# Patient Record
Sex: MALE | Race: WHITE | NOT HISPANIC OR LATINO | Employment: OTHER | URBAN - METROPOLITAN AREA
[De-identification: names, ages, dates, MRNs, and addresses within clinical notes are randomized per-mention and may not be internally consistent; named-entity substitution may affect disease eponyms.]

---

## 2021-09-05 ENCOUNTER — APPOINTMENT (OUTPATIENT)
Dept: RADIOLOGY | Facility: MEDICAL CENTER | Age: 86
DRG: 268 | End: 2021-09-05
Attending: SURGERY
Payer: MEDICARE

## 2021-09-05 ENCOUNTER — HOSPITAL ENCOUNTER (OUTPATIENT)
Dept: RADIOLOGY | Facility: MEDICAL CENTER | Age: 86
End: 2021-09-05

## 2021-09-05 ENCOUNTER — HOSPITAL ENCOUNTER (INPATIENT)
Facility: MEDICAL CENTER | Age: 86
LOS: 4 days | DRG: 268 | End: 2021-09-09
Attending: EMERGENCY MEDICINE | Admitting: INTERNAL MEDICINE
Payer: MEDICARE

## 2021-09-05 ENCOUNTER — ANESTHESIA (OUTPATIENT)
Dept: SURGERY | Facility: MEDICAL CENTER | Age: 86
DRG: 268 | End: 2021-09-05
Payer: MEDICARE

## 2021-09-05 ENCOUNTER — ANESTHESIA EVENT (OUTPATIENT)
Dept: SURGERY | Facility: MEDICAL CENTER | Age: 86
DRG: 268 | End: 2021-09-05
Payer: MEDICARE

## 2021-09-05 DIAGNOSIS — I70.201 FEMORAL ARTERY OCCLUSION, RIGHT (HCC): ICD-10-CM

## 2021-09-05 DIAGNOSIS — I71.40 ABDOMINAL AORTIC ANEURYSM (AAA) WITHOUT RUPTURE (HCC): Chronic | ICD-10-CM

## 2021-09-05 DIAGNOSIS — I70.90 ARTERIAL OCCLUSION: ICD-10-CM

## 2021-09-05 PROBLEM — D72.823 LEUKEMOID REACTION: Status: ACTIVE | Noted: 2021-09-05

## 2021-09-05 PROBLEM — R73.9 HYPERGLYCEMIA: Status: ACTIVE | Noted: 2021-09-05

## 2021-09-05 PROBLEM — R74.8 ALKALINE PHOSPHATASE ELEVATION: Status: ACTIVE | Noted: 2021-09-05

## 2021-09-05 PROBLEM — R03.0 ELEVATED BP WITHOUT DIAGNOSIS OF HYPERTENSION: Status: ACTIVE | Noted: 2021-09-05

## 2021-09-05 LAB
ABO + RH BLD: NORMAL
ABO GROUP BLD: NORMAL
ALBUMIN SERPL BCP-MCNC: 3.9 G/DL (ref 3.2–4.9)
ALBUMIN/GLOB SERPL: 1.4 G/DL
ALP SERPL-CCNC: 101 U/L (ref 30–99)
ALT SERPL-CCNC: 11 U/L (ref 2–50)
ANION GAP SERPL CALC-SCNC: 12 MMOL/L (ref 7–16)
APTT PPP: 224.2 SEC (ref 24.7–36)
AST SERPL-CCNC: 23 U/L (ref 12–45)
BASOPHILS # BLD AUTO: 0.2 % (ref 0–1.8)
BASOPHILS # BLD: 0.04 K/UL (ref 0–0.12)
BILIRUB SERPL-MCNC: 0.6 MG/DL (ref 0.1–1.5)
BLD GP AB SCN SERPL QL: NORMAL
BUN SERPL-MCNC: 18 MG/DL (ref 8–22)
CALCIUM SERPL-MCNC: 8.5 MG/DL (ref 8.5–10.5)
CHLORIDE SERPL-SCNC: 102 MMOL/L (ref 96–112)
CO2 SERPL-SCNC: 25 MMOL/L (ref 20–33)
CREAT SERPL-MCNC: 0.72 MG/DL (ref 0.5–1.4)
EKG IMPRESSION: NORMAL
EOSINOPHIL # BLD AUTO: 0.03 K/UL (ref 0–0.51)
EOSINOPHIL NFR BLD: 0.2 % (ref 0–6.9)
ERYTHROCYTE [DISTWIDTH] IN BLOOD BY AUTOMATED COUNT: 44.3 FL (ref 35.9–50)
EST. AVERAGE GLUCOSE BLD GHB EST-MCNC: 111 MG/DL
GLOBULIN SER CALC-MCNC: 2.7 G/DL (ref 1.9–3.5)
GLUCOSE SERPL-MCNC: 164 MG/DL (ref 65–99)
HBA1C MFR BLD: 5.5 % (ref 4–5.6)
HCT VFR BLD AUTO: 44.6 % (ref 42–52)
HGB BLD-MCNC: 14.7 G/DL (ref 14–18)
IMM GRANULOCYTES # BLD AUTO: 0.14 K/UL (ref 0–0.11)
IMM GRANULOCYTES NFR BLD AUTO: 0.7 % (ref 0–0.9)
INR PPP: 1.34 (ref 0.87–1.13)
LYMPHOCYTES # BLD AUTO: 0.99 K/UL (ref 1–4.8)
LYMPHOCYTES NFR BLD: 5 % (ref 22–41)
MCH RBC QN AUTO: 32.5 PG (ref 27–33)
MCHC RBC AUTO-ENTMCNC: 33 G/DL (ref 33.7–35.3)
MCV RBC AUTO: 98.5 FL (ref 81.4–97.8)
MONOCYTES # BLD AUTO: 1.5 K/UL (ref 0–0.85)
MONOCYTES NFR BLD AUTO: 7.5 % (ref 0–13.4)
NEUTROPHILS # BLD AUTO: 17.28 K/UL (ref 1.82–7.42)
NEUTROPHILS NFR BLD: 86.4 % (ref 44–72)
NRBC # BLD AUTO: 0 K/UL
NRBC BLD-RTO: 0 /100 WBC
PLATELET # BLD AUTO: 223 K/UL (ref 164–446)
PMV BLD AUTO: 11.6 FL (ref 9–12.9)
POTASSIUM SERPL-SCNC: 4.1 MMOL/L (ref 3.6–5.5)
PROT SERPL-MCNC: 6.6 G/DL (ref 6–8.2)
PROTHROMBIN TIME: 16.2 SEC (ref 12–14.6)
RBC # BLD AUTO: 4.53 M/UL (ref 4.7–6.1)
RH BLD: NORMAL
SARS-COV+SARS-COV-2 AG RESP QL IA.RAPID: NOTDETECTED
SODIUM SERPL-SCNC: 139 MMOL/L (ref 135–145)
SPECIMEN SOURCE: NORMAL
UFH PPP CHRO-ACNC: 0.94 IU/ML
WBC # BLD AUTO: 20 K/UL (ref 4.8–10.8)

## 2021-09-05 PROCEDURE — 34705 EVAC RPR A-BIILIAC NDGFT: CPT | Performed by: SURGERY

## 2021-09-05 PROCEDURE — 160041 HCHG SURGERY MINUTES - EA ADDL 1 MIN LEVEL 4: Performed by: SURGERY

## 2021-09-05 PROCEDURE — 700101 HCHG RX REV CODE 250: Performed by: STUDENT IN AN ORGANIZED HEALTH CARE EDUCATION/TRAINING PROGRAM

## 2021-09-05 PROCEDURE — 36415 COLL VENOUS BLD VENIPUNCTURE: CPT

## 2021-09-05 PROCEDURE — C1757 CATH, THROMBECTOMY/EMBOLECT: HCPCS | Performed by: SURGERY

## 2021-09-05 PROCEDURE — 75710 ARTERY X-RAYS ARM/LEG: CPT | Mod: 26,59,RT | Performed by: SURGERY

## 2021-09-05 PROCEDURE — 500869 HCHG NEEDLE, THINWALL 19GA (COOK): Performed by: SURGERY

## 2021-09-05 PROCEDURE — 75625 CONTRAST EXAM ABDOMINL AORTA: CPT | Mod: 26,59 | Performed by: SURGERY

## 2021-09-05 PROCEDURE — 85520 HEPARIN ASSAY: CPT

## 2021-09-05 PROCEDURE — 700105 HCHG RX REV CODE 258: Performed by: SURGERY

## 2021-09-05 PROCEDURE — 99291 CRITICAL CARE FIRST HOUR: CPT

## 2021-09-05 PROCEDURE — 86850 RBC ANTIBODY SCREEN: CPT

## 2021-09-05 PROCEDURE — 04V03EZ RESTRICTION OF ABDOMINAL AORTA WITH BRANCHED OR FENESTRATED INTRALUMINAL DEVICE, ONE OR TWO ARTERIES, PERCUTANEOUS APPROACH: ICD-10-PCS | Performed by: SURGERY

## 2021-09-05 PROCEDURE — 80053 COMPREHEN METABOLIC PANEL: CPT

## 2021-09-05 PROCEDURE — 03HY32Z INSERTION OF MONITORING DEVICE INTO UPPER ARTERY, PERCUTANEOUS APPROACH: ICD-10-PCS | Performed by: STUDENT IN AN ORGANIZED HEALTH CARE EDUCATION/TRAINING PROGRAM

## 2021-09-05 PROCEDURE — C1769 GUIDE WIRE: HCPCS | Performed by: SURGERY

## 2021-09-05 PROCEDURE — 85730 THROMBOPLASTIN TIME PARTIAL: CPT

## 2021-09-05 PROCEDURE — C1894 INTRO/SHEATH, NON-LASER: HCPCS | Performed by: SURGERY

## 2021-09-05 PROCEDURE — 87426 SARSCOV CORONAVIRUS AG IA: CPT

## 2021-09-05 PROCEDURE — 700101 HCHG RX REV CODE 250: Performed by: SURGERY

## 2021-09-05 PROCEDURE — 700111 HCHG RX REV CODE 636 W/ 250 OVERRIDE (IP): Performed by: STUDENT IN AN ORGANIZED HEALTH CARE EDUCATION/TRAINING PROGRAM

## 2021-09-05 PROCEDURE — 99223 1ST HOSP IP/OBS HIGH 75: CPT | Mod: AI | Performed by: INTERNAL MEDICINE

## 2021-09-05 PROCEDURE — 501838 HCHG SUTURE GENERAL: Performed by: SURGERY

## 2021-09-05 PROCEDURE — 502000 HCHG MISC OR IMPLANTS RC 0278: Performed by: SURGERY

## 2021-09-05 PROCEDURE — 160036 HCHG PACU - EA ADDL 30 MINS PHASE I: Performed by: SURGERY

## 2021-09-05 PROCEDURE — 500258 HCHG CATH, SIZING OMNI 5F 70CM: Performed by: SURGERY

## 2021-09-05 PROCEDURE — A9270 NON-COVERED ITEM OR SERVICE: HCPCS | Performed by: SURGERY

## 2021-09-05 PROCEDURE — 93005 ELECTROCARDIOGRAM TRACING: CPT | Performed by: EMERGENCY MEDICINE

## 2021-09-05 PROCEDURE — 86900 BLOOD TYPING SEROLOGIC ABO: CPT

## 2021-09-05 PROCEDURE — 160035 HCHG PACU - 1ST 60 MINS PHASE I: Performed by: SURGERY

## 2021-09-05 PROCEDURE — 500444 HCHG HEMOSTAT, SURGICEL 2X3: Performed by: SURGERY

## 2021-09-05 PROCEDURE — 34203 REMOVAL OF LEG ARTERY CLOT: CPT | Mod: RT | Performed by: SURGERY

## 2021-09-05 PROCEDURE — 83036 HEMOGLOBIN GLYCOSYLATED A1C: CPT

## 2021-09-05 PROCEDURE — 34812 OPN FEM ART EXPOS: CPT | Mod: 50,59 | Performed by: SURGERY

## 2021-09-05 PROCEDURE — 700102 HCHG RX REV CODE 250 W/ 637 OVERRIDE(OP): Performed by: SURGERY

## 2021-09-05 PROCEDURE — C1725 CATH, TRANSLUMIN NON-LASER: HCPCS | Performed by: SURGERY

## 2021-09-05 PROCEDURE — 86901 BLOOD TYPING SEROLOGIC RH(D): CPT

## 2021-09-05 PROCEDURE — C1887 CATHETER, GUIDING: HCPCS

## 2021-09-05 PROCEDURE — 99221 1ST HOSP IP/OBS SF/LOW 40: CPT | Mod: 57 | Performed by: SURGERY

## 2021-09-05 PROCEDURE — 96365 THER/PROPH/DIAG IV INF INIT: CPT

## 2021-09-05 PROCEDURE — A9270 NON-COVERED ITEM OR SERVICE: HCPCS | Performed by: STUDENT IN AN ORGANIZED HEALTH CARE EDUCATION/TRAINING PROGRAM

## 2021-09-05 PROCEDURE — 04CK0ZZ EXTIRPATION OF MATTER FROM RIGHT FEMORAL ARTERY, OPEN APPROACH: ICD-10-PCS | Performed by: SURGERY

## 2021-09-05 PROCEDURE — 770006 HCHG ROOM/CARE - MED/SURG/GYN SEMI*

## 2021-09-05 PROCEDURE — 502594 HCHG SCISSOR HANDLE, HARMONIC ACE: Performed by: SURGERY

## 2021-09-05 PROCEDURE — 500166 HCHG CATH, ANGIO: Performed by: SURGERY

## 2021-09-05 PROCEDURE — 160048 HCHG OR STATISTICAL LEVEL 1-5: Performed by: SURGERY

## 2021-09-05 PROCEDURE — 85025 COMPLETE CBC W/AUTO DIFF WBC: CPT

## 2021-09-05 PROCEDURE — 160009 HCHG ANES TIME/MIN: Performed by: SURGERY

## 2021-09-05 PROCEDURE — 700105 HCHG RX REV CODE 258: Performed by: STUDENT IN AN ORGANIZED HEALTH CARE EDUCATION/TRAINING PROGRAM

## 2021-09-05 PROCEDURE — 700102 HCHG RX REV CODE 250 W/ 637 OVERRIDE(OP): Performed by: STUDENT IN AN ORGANIZED HEALTH CARE EDUCATION/TRAINING PROGRAM

## 2021-09-05 PROCEDURE — 35226 REPAIR BLOOD VESSEL DIR LXTR: CPT | Mod: 59,LT | Performed by: SURGERY

## 2021-09-05 PROCEDURE — 700117 HCHG RX CONTRAST REV CODE 255: Performed by: SURGERY

## 2021-09-05 PROCEDURE — 85610 PROTHROMBIN TIME: CPT

## 2021-09-05 PROCEDURE — 700111 HCHG RX REV CODE 636 W/ 250 OVERRIDE (IP): Performed by: EMERGENCY MEDICINE

## 2021-09-05 PROCEDURE — 501837 HCHG SUTURE CV: Performed by: SURGERY

## 2021-09-05 PROCEDURE — 160029 HCHG SURGERY MINUTES - 1ST 30 MINS LEVEL 4: Performed by: SURGERY

## 2021-09-05 PROCEDURE — 160002 HCHG RECOVERY MINUTES (STAT): Performed by: SURGERY

## 2021-09-05 PROCEDURE — 34201 REMOVAL OF ARTERY CLOT: CPT | Mod: RT | Performed by: SURGERY

## 2021-09-05 PROCEDURE — 04CM0ZZ EXTIRPATION OF MATTER FROM RIGHT POPLITEAL ARTERY, OPEN APPROACH: ICD-10-PCS | Performed by: SURGERY

## 2021-09-05 PROCEDURE — 05HB33Z INSERTION OF INFUSION DEVICE INTO RIGHT BASILIC VEIN, PERCUTANEOUS APPROACH: ICD-10-PCS | Performed by: STUDENT IN AN ORGANIZED HEALTH CARE EDUCATION/TRAINING PROGRAM

## 2021-09-05 DEVICE — IMPLANTABLE DEVICE: Type: IMPLANTABLE DEVICE | Status: FUNCTIONAL

## 2021-09-05 RX ORDER — ONDANSETRON 2 MG/ML
4 INJECTION INTRAMUSCULAR; INTRAVENOUS
Status: COMPLETED | OUTPATIENT
Start: 2021-09-05 | End: 2021-09-05

## 2021-09-05 RX ORDER — ONDANSETRON 2 MG/ML
4 INJECTION INTRAMUSCULAR; INTRAVENOUS EVERY 4 HOURS PRN
Status: DISCONTINUED | OUTPATIENT
Start: 2021-09-05 | End: 2021-09-09 | Stop reason: HOSPADM

## 2021-09-05 RX ORDER — HEPARIN SODIUM 1000 [USP'U]/ML
40 INJECTION, SOLUTION INTRAVENOUS; SUBCUTANEOUS PRN
Status: DISCONTINUED | OUTPATIENT
Start: 2021-09-05 | End: 2021-09-05

## 2021-09-05 RX ORDER — HEPARIN SODIUM 1000 [USP'U]/ML
INJECTION, SOLUTION INTRAVENOUS; SUBCUTANEOUS PRN
Status: DISCONTINUED | OUTPATIENT
Start: 2021-09-05 | End: 2021-09-05 | Stop reason: SURG

## 2021-09-05 RX ORDER — SODIUM CHLORIDE, SODIUM LACTATE, POTASSIUM CHLORIDE, CALCIUM CHLORIDE 600; 310; 30; 20 MG/100ML; MG/100ML; MG/100ML; MG/100ML
INJECTION, SOLUTION INTRAVENOUS CONTINUOUS
Status: ACTIVE | OUTPATIENT
Start: 2021-09-05 | End: 2021-09-06

## 2021-09-05 RX ORDER — LABETALOL HYDROCHLORIDE 5 MG/ML
10 INJECTION, SOLUTION INTRAVENOUS EVERY 4 HOURS PRN
Status: DISCONTINUED | OUTPATIENT
Start: 2021-09-05 | End: 2021-09-09 | Stop reason: HOSPADM

## 2021-09-05 RX ORDER — HALOPERIDOL 5 MG/ML
1 INJECTION INTRAMUSCULAR
Status: DISCONTINUED | OUTPATIENT
Start: 2021-09-05 | End: 2021-09-05 | Stop reason: HOSPADM

## 2021-09-05 RX ORDER — ONDANSETRON 2 MG/ML
INJECTION INTRAMUSCULAR; INTRAVENOUS PRN
Status: DISCONTINUED | OUTPATIENT
Start: 2021-09-05 | End: 2021-09-05 | Stop reason: SURG

## 2021-09-05 RX ORDER — OXYCODONE HCL 5 MG/5 ML
10 SOLUTION, ORAL ORAL
Status: DISCONTINUED | OUTPATIENT
Start: 2021-09-05 | End: 2021-09-05 | Stop reason: HOSPADM

## 2021-09-05 RX ORDER — HEPARIN SODIUM 5000 [USP'U]/100ML
0-30 INJECTION, SOLUTION INTRAVENOUS CONTINUOUS
Status: DISCONTINUED | OUTPATIENT
Start: 2021-09-05 | End: 2021-09-05

## 2021-09-05 RX ORDER — OXYCODONE HYDROCHLORIDE 5 MG/1
5 TABLET ORAL
Status: DISCONTINUED | OUTPATIENT
Start: 2021-09-05 | End: 2021-09-05

## 2021-09-05 RX ORDER — LIDOCAINE HYDROCHLORIDE 40 MG/ML
SOLUTION TOPICAL PRN
Status: DISCONTINUED | OUTPATIENT
Start: 2021-09-05 | End: 2021-09-05 | Stop reason: SURG

## 2021-09-05 RX ORDER — BISACODYL 10 MG
10 SUPPOSITORY, RECTAL RECTAL
Status: DISCONTINUED | OUTPATIENT
Start: 2021-09-05 | End: 2021-09-07

## 2021-09-05 RX ORDER — MORPHINE SULFATE 4 MG/ML
1-3 INJECTION, SOLUTION INTRAMUSCULAR; INTRAVENOUS
Status: DISCONTINUED | OUTPATIENT
Start: 2021-09-05 | End: 2021-09-07

## 2021-09-05 RX ORDER — SODIUM CHLORIDE, SODIUM LACTATE, POTASSIUM CHLORIDE, CALCIUM CHLORIDE 600; 310; 30; 20 MG/100ML; MG/100ML; MG/100ML; MG/100ML
INJECTION, SOLUTION INTRAVENOUS
Status: DISCONTINUED | OUTPATIENT
Start: 2021-09-05 | End: 2021-09-05 | Stop reason: SURG

## 2021-09-05 RX ORDER — AMOXICILLIN 250 MG
2 CAPSULE ORAL 2 TIMES DAILY
Status: DISCONTINUED | OUTPATIENT
Start: 2021-09-05 | End: 2021-09-07

## 2021-09-05 RX ORDER — DIPHENHYDRAMINE HYDROCHLORIDE 50 MG/ML
12.5 INJECTION INTRAMUSCULAR; INTRAVENOUS
Status: DISCONTINUED | OUTPATIENT
Start: 2021-09-05 | End: 2021-09-05 | Stop reason: HOSPADM

## 2021-09-05 RX ORDER — CEFAZOLIN SODIUM 1 G/3ML
INJECTION, POWDER, FOR SOLUTION INTRAMUSCULAR; INTRAVENOUS PRN
Status: DISCONTINUED | OUTPATIENT
Start: 2021-09-05 | End: 2021-09-05 | Stop reason: SURG

## 2021-09-05 RX ORDER — ONDANSETRON 4 MG/1
4 TABLET, ORALLY DISINTEGRATING ORAL EVERY 4 HOURS PRN
Status: DISCONTINUED | OUTPATIENT
Start: 2021-09-05 | End: 2021-09-09 | Stop reason: HOSPADM

## 2021-09-05 RX ORDER — HYDROMORPHONE HYDROCHLORIDE 1 MG/ML
0.1 INJECTION, SOLUTION INTRAMUSCULAR; INTRAVENOUS; SUBCUTANEOUS
Status: DISCONTINUED | OUTPATIENT
Start: 2021-09-05 | End: 2021-09-05 | Stop reason: HOSPADM

## 2021-09-05 RX ORDER — BUPIVACAINE HYDROCHLORIDE AND EPINEPHRINE 5; 5 MG/ML; UG/ML
INJECTION, SOLUTION EPIDURAL; INTRACAUDAL; PERINEURAL
Status: DISCONTINUED | OUTPATIENT
Start: 2021-09-05 | End: 2021-09-05 | Stop reason: HOSPADM

## 2021-09-05 RX ORDER — GABAPENTIN 300 MG/1
300 CAPSULE ORAL ONCE
Status: COMPLETED | OUTPATIENT
Start: 2021-09-05 | End: 2021-09-05

## 2021-09-05 RX ORDER — HYDROCODONE BITARTRATE AND ACETAMINOPHEN 5; 325 MG/1; MG/1
1-2 TABLET ORAL EVERY 6 HOURS PRN
Status: DISCONTINUED | OUTPATIENT
Start: 2021-09-05 | End: 2021-09-07

## 2021-09-05 RX ORDER — HYDROMORPHONE HYDROCHLORIDE 1 MG/ML
0.4 INJECTION, SOLUTION INTRAMUSCULAR; INTRAVENOUS; SUBCUTANEOUS
Status: DISCONTINUED | OUTPATIENT
Start: 2021-09-05 | End: 2021-09-05 | Stop reason: HOSPADM

## 2021-09-05 RX ORDER — HYDROMORPHONE HYDROCHLORIDE 1 MG/ML
0.2 INJECTION, SOLUTION INTRAMUSCULAR; INTRAVENOUS; SUBCUTANEOUS
Status: DISCONTINUED | OUTPATIENT
Start: 2021-09-05 | End: 2021-09-05 | Stop reason: HOSPADM

## 2021-09-05 RX ORDER — POLYETHYLENE GLYCOL 3350 17 G/17G
1 POWDER, FOR SOLUTION ORAL
Status: DISCONTINUED | OUTPATIENT
Start: 2021-09-05 | End: 2021-09-07

## 2021-09-05 RX ORDER — ONDANSETRON 2 MG/ML
4 INJECTION INTRAMUSCULAR; INTRAVENOUS EVERY 4 HOURS PRN
Status: DISCONTINUED | OUTPATIENT
Start: 2021-09-05 | End: 2021-09-05

## 2021-09-05 RX ORDER — IPRATROPIUM BROMIDE AND ALBUTEROL SULFATE 2.5; .5 MG/3ML; MG/3ML
3 SOLUTION RESPIRATORY (INHALATION)
Status: DISCONTINUED | OUTPATIENT
Start: 2021-09-05 | End: 2021-09-05 | Stop reason: HOSPADM

## 2021-09-05 RX ORDER — CEFAZOLIN SODIUM 2 G/100ML
2 INJECTION, SOLUTION INTRAVENOUS EVERY 8 HOURS
Status: COMPLETED | OUTPATIENT
Start: 2021-09-06 | End: 2021-09-06

## 2021-09-05 RX ORDER — LABETALOL HYDROCHLORIDE 5 MG/ML
5 INJECTION, SOLUTION INTRAVENOUS
Status: DISCONTINUED | OUTPATIENT
Start: 2021-09-05 | End: 2021-09-05 | Stop reason: HOSPADM

## 2021-09-05 RX ORDER — OXYCODONE HYDROCHLORIDE 5 MG/1
2.5 TABLET ORAL
Status: DISCONTINUED | OUTPATIENT
Start: 2021-09-05 | End: 2021-09-05

## 2021-09-05 RX ORDER — LABETALOL HYDROCHLORIDE 5 MG/ML
5 INJECTION, SOLUTION INTRAVENOUS
Status: COMPLETED | OUTPATIENT
Start: 2021-09-05 | End: 2021-09-05

## 2021-09-05 RX ORDER — METOPROLOL TARTRATE 1 MG/ML
1 INJECTION, SOLUTION INTRAVENOUS
Status: DISCONTINUED | OUTPATIENT
Start: 2021-09-05 | End: 2021-09-05 | Stop reason: HOSPADM

## 2021-09-05 RX ORDER — OXYCODONE HCL 5 MG/5 ML
5 SOLUTION, ORAL ORAL
Status: DISCONTINUED | OUTPATIENT
Start: 2021-09-05 | End: 2021-09-05 | Stop reason: HOSPADM

## 2021-09-05 RX ORDER — KETAMINE HYDROCHLORIDE 50 MG/ML
INJECTION, SOLUTION INTRAMUSCULAR; INTRAVENOUS PRN
Status: DISCONTINUED | OUTPATIENT
Start: 2021-09-05 | End: 2021-09-05 | Stop reason: SURG

## 2021-09-05 RX ORDER — IODIXANOL 270 MG/ML
INJECTION, SOLUTION INTRAVASCULAR
Status: DISCONTINUED | OUTPATIENT
Start: 2021-09-05 | End: 2021-09-05 | Stop reason: HOSPADM

## 2021-09-05 RX ORDER — ACETAMINOPHEN 500 MG
1000 TABLET ORAL ONCE
Status: COMPLETED | OUTPATIENT
Start: 2021-09-05 | End: 2021-09-05

## 2021-09-05 RX ORDER — DEXAMETHASONE SODIUM PHOSPHATE 4 MG/ML
INJECTION, SOLUTION INTRA-ARTICULAR; INTRALESIONAL; INTRAMUSCULAR; INTRAVENOUS; SOFT TISSUE PRN
Status: DISCONTINUED | OUTPATIENT
Start: 2021-09-05 | End: 2021-09-05 | Stop reason: SURG

## 2021-09-05 RX ORDER — ACETAMINOPHEN 500 MG
500 TABLET ORAL EVERY 6 HOURS PRN
Status: DISCONTINUED | OUTPATIENT
Start: 2021-09-05 | End: 2021-09-09 | Stop reason: HOSPADM

## 2021-09-05 RX ORDER — HYDROMORPHONE HYDROCHLORIDE 1 MG/ML
0.25 INJECTION, SOLUTION INTRAMUSCULAR; INTRAVENOUS; SUBCUTANEOUS
Status: DISCONTINUED | OUTPATIENT
Start: 2021-09-05 | End: 2021-09-05

## 2021-09-05 RX ORDER — HYDRALAZINE HYDROCHLORIDE 20 MG/ML
5 INJECTION INTRAMUSCULAR; INTRAVENOUS
Status: DISCONTINUED | OUTPATIENT
Start: 2021-09-05 | End: 2021-09-05 | Stop reason: HOSPADM

## 2021-09-05 RX ADMIN — SODIUM CHLORIDE, POTASSIUM CHLORIDE, SODIUM LACTATE AND CALCIUM CHLORIDE: 600; 310; 30; 20 INJECTION, SOLUTION INTRAVENOUS at 21:16

## 2021-09-05 RX ADMIN — ONDANSETRON 4 MG: 2 INJECTION INTRAMUSCULAR; INTRAVENOUS at 16:28

## 2021-09-05 RX ADMIN — KETAMINE HYDROCHLORIDE 60 MG: 50 INJECTION INTRAMUSCULAR; INTRAVENOUS at 12:06

## 2021-09-05 RX ADMIN — ACETAMINOPHEN 1000 MG: 500 TABLET ORAL at 11:34

## 2021-09-05 RX ADMIN — ROCURONIUM BROMIDE 100 MG: 10 INJECTION, SOLUTION INTRAVENOUS at 12:06

## 2021-09-05 RX ADMIN — LIDOCAINE HYDROCHLORIDE 4 ML: 40 SOLUTION TOPICAL at 12:06

## 2021-09-05 RX ADMIN — HEPARIN SODIUM 5000 UNITS: 1000 INJECTION, SOLUTION INTRAVENOUS; SUBCUTANEOUS at 12:50

## 2021-09-05 RX ADMIN — ONDANSETRON 4 MG: 2 INJECTION INTRAMUSCULAR; INTRAVENOUS at 12:43

## 2021-09-05 RX ADMIN — CEFAZOLIN 2 G: 330 INJECTION, POWDER, FOR SOLUTION INTRAMUSCULAR; INTRAVENOUS at 12:07

## 2021-09-05 RX ADMIN — SUGAMMADEX 200 MG: 100 INJECTION, SOLUTION INTRAVENOUS at 14:50

## 2021-09-05 RX ADMIN — SODIUM CHLORIDE, POTASSIUM CHLORIDE, SODIUM LACTATE AND CALCIUM CHLORIDE: 600; 310; 30; 20 INJECTION, SOLUTION INTRAVENOUS at 11:48

## 2021-09-05 RX ADMIN — GABAPENTIN 300 MG: 300 CAPSULE ORAL at 11:34

## 2021-09-05 RX ADMIN — PHENYLEPHRINE HYDROCHLORIDE 15 MCG/MIN: 10 INJECTION INTRAVENOUS at 12:51

## 2021-09-05 RX ADMIN — METOPROLOL TARTRATE 25 MG: 25 TABLET, FILM COATED ORAL at 21:12

## 2021-09-05 RX ADMIN — FENTANYL CITRATE 100 MCG: 50 INJECTION, SOLUTION INTRAMUSCULAR; INTRAVENOUS at 12:02

## 2021-09-05 RX ADMIN — LABETALOL HYDROCHLORIDE 5 MG: 5 INJECTION INTRAVENOUS at 11:34

## 2021-09-05 RX ADMIN — EPHEDRINE SULFATE 10 MG: 50 INJECTION INTRAMUSCULAR; INTRAVENOUS; SUBCUTANEOUS at 14:15

## 2021-09-05 RX ADMIN — HEPARIN SODIUM 18 UNITS/KG/HR: 5000 INJECTION, SOLUTION INTRAVENOUS at 10:05

## 2021-09-05 RX ADMIN — PROPOFOL 50 MG: 10 INJECTION, EMULSION INTRAVENOUS at 12:06

## 2021-09-05 RX ADMIN — DEXAMETHASONE SODIUM PHOSPHATE 10 MG: 4 INJECTION, SOLUTION INTRA-ARTICULAR; INTRALESIONAL; INTRAMUSCULAR; INTRAVENOUS; SOFT TISSUE at 12:12

## 2021-09-05 ASSESSMENT — ENCOUNTER SYMPTOMS
NAUSEA: 0
COUGH: 0
DIZZINESS: 0
ABDOMINAL PAIN: 0
FEVER: 0
SHORTNESS OF BREATH: 0
HEADACHES: 0
VOMITING: 0
DIARRHEA: 0
BACK PAIN: 0
PALPITATIONS: 0
CONSTIPATION: 0
CHILLS: 0

## 2021-09-05 ASSESSMENT — PAIN DESCRIPTION - PAIN TYPE
TYPE: SURGICAL PAIN
TYPE: SURGICAL PAIN
TYPE: ACUTE PAIN
TYPE: SURGICAL PAIN

## 2021-09-05 ASSESSMENT — PAIN SCALES - GENERAL: PAIN_LEVEL: 0

## 2021-09-05 NOTE — CONSULTS
VASCULAR SURGERY CONSULTATION        DATE OF SERVICE:  09/05/2021     EMERGENCY ROOM CONSULTATION     REFERRING PHYSICIAN:  Kalen Gamboa MD     REASON FOR CONSULTATION:  Ischemic right leg and abdominal aortic aneurysm.     HISTORY OF PRESENT ILLNESS:  This is a pleasant 86-year-old male who has not   seen a doctor for a number of years, who presented to the emergency room   at [facility] with a sudden onset of right leg discoloration and pain, which   happened at 6:00 a.m.  A CTA was obtained and showed occlusion of the   distal right common femoral artery as well as a 6.5 cm abdominal aortic   aneurysm.  Even though there are vascular surgeons there, patient was not seen   by a vascular surgeon there and transferred to [facility] Emergency Room without me   being contacted first.  The patient arrived to the emergency room and I was   contacted by the emergency room provider.     PAST MEDICAL HISTORY:  Unremarkable, although the patient has not seen a   physician for a number of years.     ALLERGIES:  NKDA.     HOME MEDICATIONS:  None.     SOCIAL HISTORY:  Significant for past history of tobacco use.  The patient   stopped smoking 20 years ago.  He denies alcohol abuse or illicit drug use.     FAMILY HISTORY:  Negative for aneurysm.     REVIEW OF SYSTEMS:  Significant for right leg pain and numbness.  The patient   denies chest pain or shortness of breath.     PHYSICAL EXAMINATION:    VITAL SIGNS:  Afebrile, vital signs stable.  GENERAL:  Elderly, frail male in no apparent distress.  LUNGS:  Decreased breath sounds at bases.  CARDIOVASCULAR:  Regular rate and rhythm.  UPPER EXTREMITIES:  Palpable radial pulses bilaterally.  ABDOMEN:  Soft, nondistended, nontender, large pulsatile mass.  LOWER EXTREMITIES:  Palpable bilateral femoral pulses.  There is a water   hammer pulses on the right side.  The left popliteal and dorsalis pedis pulses   are palpable.  The right popliteal and pedal pulses are not palpable.  The    right leg is cyanotic.  There is no pulsatile mass in the popliteal fossa area   on either side.  NEUROLOGIC:  Grossly nonfocal.     DIAGNOSTIC DATA:  CTA images were reviewed by me.     LABORATORY DATA:  Reviewed by me.     ASSESSMENT:    1.  Critical right leg ischemia secondary to arterial thromboembolism from abdominal aortic aneurysm.  2.  Symptomatic 6.5 cm abdominal aortic aneurysm.     PLAN:  I had a long discussion with the patient and his wife.  More than   Likely, patient threw a clot from the aneurysm down into his right leg.  I discussed with   them the option of undergoing right leg thrombectomy and angiogram as well as   concomitant repair of the aneurysm to prevent recurrent thromboembolism.  With   the patient being over 81 years of age and frail, open repair is not an optimal   option.  Fortunately, his aneurysm appeared to be amenable to stent graft   repair.  I therefore discussed with them the option of stent graft repair of   the aneurysm.  Risks and benefits of the procedures were discussed in details.    Risks include, but not limited to, bleeding, infection, contrast reaction,   contrast-induced renal failure, heart attack, perioperative anesthetic   complications, and even death.  The alternative of not having the procedure   done was also discussed.  With this option, there is a risk of limb loss.  I   also told them that if following reperfusion of the right leg and the patient   developed signs of compartment syndrome, I would have to do a fasciotomy as   well.  The patient and his wife indicated understanding and would like to   proceed.  All questions were answered.        ______________________________  MD ALINE Tejada/JESSICA    DD:  09/05/2021 11:37  DT:  09/05/2021 12:26    Job#:  [id number]

## 2021-09-05 NOTE — ANESTHESIA PROCEDURE NOTES
Arterial Line  Performed by: Mj Quinteros M.D.  Authorized by: Mj Quinteros M.D.     Start Time:  9/5/2021 11:32 AM  End Time:  9/5/2021 11:35 AM  Localization: surface landmarks    Patient Location:  Pre-op  Indication: continuous blood pressure monitoring        Catheter Size:  20 G  Seldinger Technique?: Yes    Laterality:  Right  Site:  Radial artery  Line Secured:  Antimicrobial disc, tape and transparent dressing  Events: patient tolerated procedure well with no complications

## 2021-09-05 NOTE — ASSESSMENT & PLAN NOTE
-No prior history of DM  -During admission A1c 5.5%  -No pharmacological intervention necessary at this time

## 2021-09-05 NOTE — ED NOTES
Lab called with critical result of APTT of 224.2 at 1054. Critical lab result read back to lab.   Dr. Gamboa notified of critical lab result at 1056.  Critical lab result read back by Dr. Gamboa.

## 2021-09-05 NOTE — CONSULTS
VASCULAR SURGERY CONSULTATION        Seen and examined.  Please see dictated note, #[id number].    To OR for R leg thrombectomy and angiogram and stent graft repair of 6.5cm AAA.

## 2021-09-05 NOTE — ANESTHESIA PROCEDURE NOTES
Airway    Date/Time: 9/5/2021 12:07 PM  Performed by: Mj Quinteros M.D.  Authorized by: Mj Quinteros M.D.     Location:  OR  Urgency:  Elective  Indications for Airway Management:  Anesthesia      Spontaneous Ventilation: absent    Sedation Level:  Deep  Preoxygenated: Yes    Patient Position:  Sniffing  Mask Difficulty Assessment:  0 - not attempted  Final Airway Type:  Endotracheal airway  Final Endotracheal Airway:  ETT  Cuffed: Yes    Technique Used for Successful ETT Placement:  Direct laryngoscopy    Insertion Site:  Oral  Blade Type:  Randall  Laryngoscope Blade/Videolaryngoscope Blade Size:  2  ETT Size (mm):  7.5  Measured from:  Teeth  ETT to Teeth (cm):  24  Placement Verified by: auscultation and capnometry    Cormack-Lehane Classification:  Grade I - full view of glottis  Number of Attempts at Approach:  1  Ventilation Between Attempts:  None  Number of Other Approaches Attempted:  0

## 2021-09-05 NOTE — OP REPORT
DATE OF SERVICE:  09/05/2021     SURGEON:  Tatum Paez MD     ASSISTANT:  Ludwig Dasilva MD     ANESTHESIOLOGIST:  Mj Quinteros MD     TYPE OF ANESTHESIA:  General anesthesia.     PREOPERATIVE DIAGNOSIS:  Critical right leg ischemia secondary to arterial   thromboembolism from 6.5 cm abdominal aortic aneurysm.     POSTOPERATIVE DIAGNOSIS:  Critical right leg ischemia secondary to arterial   thromboembolism from 6.5 cm abdominal aortic aneurysm.     PROCEDURES:   1.  Right superficial femoral, popliteal artery and profunda femoral arterial   thromboembolectomy.  2.  Left common femoral artery exposure.  3.  Stent graft repair of symptomatic 6.5 cm abdominal aortic aneurysm using   [employer] Endurant devices:  25 x 14 x 103 mm main device via left femoral   approach, 16 x 124 x 16 mm right iliac limb, and 16 x 124 x 13 mm left iliac   limb.  4.  Right leg angiogram.     INDICATION FOR PROCEDURE:  This is a pleasant 86-year-old male who has not   seen a doctor for a while, who presented to the emergency room with a new   onset of right lower extremity pain and discoloration.  CTA was performed and   suggestive of thrombosis of the common femoral artery on the right side as   well as large 6.5 cm abdominal aortic aneurysm.  A discussion was made with   the patient and his wife.  They would like to undergo above procedure for limb   salvage and to prevent recurrent thromboembolism, fully understanding all   risks.       Dictation Ends Here.    This is an incomplete dictation.  Please disregard this and see the completed dictation.        ______________________________  Tatum Paez MD    TQN/ANI    DD:  09/05/2021 15:58  DT:  09/05/2021 16:43    Job#:  [id number]

## 2021-09-05 NOTE — ASSESSMENT & PLAN NOTE
-Patient was a transfer from [facility] after findings on CT scan, concerns for right femoral artery occlusion from embolism of AAA of 6.5 cm  -Patient denied any family history of aneurysms  -s/p RLE thrombectomy and AAA stent graft repair on 9/5/21  Plan:  -Continue strict BP control with goal of 105-120, on Metoprolol tartrate  -Will need vascular outpatient follow up and repeat CT angiography   -PT/OT consulted post-operatively

## 2021-09-05 NOTE — ASSESSMENT & PLAN NOTE
-Patient has not seen a physician in over 10 years, not on antihypertensives at home  -In setting of AAA s/p stent graft repair would like stricter BP control  Plan:  -Continue Metoprolol as discussed in AAA section, goal -120 mmHg

## 2021-09-05 NOTE — ASSESSMENT & PLAN NOTE
-Acute onset severe RLE pain 2/2 femoral arterial thrombus presumed from AAA  -No history of peripheral vascular disease, quit smoking 20 years ago  -Not on any therapy prior to admission  -s/p RLE thrombectomy  Plan:  -Continue ASA + Atorvastatin  -Pain resolved s/p intervention

## 2021-09-05 NOTE — H&P
Hospital Medicine History & Physical Note    Date of Service  9/5/2021    Primary Care Physician  Pcp Pt States None    Consultants  vascular surgery  Specialist Names: Dr. Paez    Code Status  Full Code    Chief Complaint  Chief Complaint   Patient presents with   • Leg Pain       History of Presenting Illness  Pete Wilson is a 86 y.o. male who presented 9/5/2021 as a transfer from [facility] for acute right foot/leg pain starting around 5 AM.  Patient describes his foot was pale and cool, 10 out of 10 pain.  At present although patient was found to have a right femoral arterial occlusion, ED physician had reported thrombus in the common femoral artery.  At outside hospital patient was given Dilaudid, fentanyl, Zofran, Doppler ultrasound was able to  right lower extremity pulses.  Patient also has incidental 6 cm AAA without complication on CT angio.  Patient was started on a heparin bolus and drip for transfer.    Spoke to EDP Dr. Gamboa, reports CT possibly had bladder mass, complete paperwork not given to [facility] ED by outside facility.  Patient has no prior records at this facility on [employer] EMR.    I discussed the plan of care with patient, bedside RN, charge RN, , pharmacy and EDP.    Review of Systems  Review of Systems   Constitutional: Negative for chills, fever and malaise/fatigue.   Respiratory: Negative for cough and shortness of breath.    Cardiovascular: Negative for chest pain and palpitations.   Gastrointestinal: Negative for abdominal pain, constipation, diarrhea, nausea and vomiting.   Musculoskeletal: Negative for back pain and joint pain.        Right leg pain, cold leg   Neurological: Negative for dizziness and headaches.   All other systems reviewed and are negative.    Past Medical History  Patient stated no past medical history, former smoker 20 years ago.    Surgical History  Patient stated no past surgical history    Family History  Patient denied any family  history, no reported aneurysms.  Brother with cancer, sister with heart disease.  Family history reviewed with patient. There is no family history that is pertinent to the chief complaint.     Social History   reports that he has quit smoking. His smoking use included cigarettes. He does not have any smokeless tobacco history on file. He reports that he does not drink alcohol and does not use drugs.    Allergies  No Known Allergies    Medications  None   Patient does not take any medications at home    Physical Exam  Temp:  [36.2 °C (97.2 °F)-36.6 °C (97.9 °F)] 36.2 °C (97.2 °F)  Pulse:  [70-78] 71  Resp:  [13-18] 16  BP: (166-177)/(74-83) 172/79  SpO2:  [96 %-98 %] 96 %    Physical Exam  Vitals and nursing note reviewed.   Constitutional:       General: He is not in acute distress.     Appearance: Normal appearance. He is not diaphoretic.   HENT:      Head: Normocephalic and atraumatic.      Right Ear: External ear normal.      Left Ear: External ear normal.   Eyes:      General: No scleral icterus.     Extraocular Movements: Extraocular movements intact.   Cardiovascular:      Rate and Rhythm: Normal rate and regular rhythm.      Pulses: Normal pulses.      Heart sounds: No murmur heard.        Comments: Distant heart sounds  Pulmonary:      Effort: Pulmonary effort is normal. No respiratory distress.      Breath sounds: Normal breath sounds.      Comments: On supplemental oxygen  Abdominal:      General: Abdomen is flat. Bowel sounds are normal. There is no distension.      Palpations: Abdomen is soft.      Comments: Bounding pulse over abdomen   Musculoskeletal:         General: Tenderness (Right leg) present. No deformity.      Comments: Sarcopenic   Skin:     General: Skin is warm.      Capillary Refill: Capillary refill takes 2 to 3 seconds. Right dorsalis pedis pulse heard on Doppler     Coloration: Skin is pale (Right leg). Skin is not jaundiced.   Neurological:      General: No focal deficit present.       Mental Status: He is alert and oriented to person, place, and time. Mental status is at baseline.      Motor: Weakness present.   Psychiatric:         Mood and Affect: Mood normal.         Behavior: Behavior normal.         Thought Content: Thought content normal.         Judgment: Judgment normal.       Laboratory:  Recent Labs     09/05/21  1012   WBC 20.0*   RBC 4.53*   HEMOGLOBIN 14.7   HEMATOCRIT 44.6   MCV 98.5*   MCH 32.5   MCHC 33.0*   RDW 44.3   PLATELETCT 223   MPV 11.6     Recent Labs     09/05/21  1012   SODIUM 139   POTASSIUM 4.1   CHLORIDE 102   CO2 25   GLUCOSE 164*   BUN 18   CREATININE 0.72   CALCIUM 8.5     Recent Labs     09/05/21  1012   ALTSGPT 11   ASTSGOT 23   ALKPHOSPHAT 101*   TBILIRUBIN 0.6   GLUCOSE 164*     Recent Labs     09/05/21  1012   APTT 224.2*   INR 1.34*     No results for input(s): NTPROBNP in the last 72 hours.      No results for input(s): TROPONINT in the last 72 hours.    Imaging:  CT-FOREIGN FILM CAT SCAN   Final Result      IR-ABDOMINAL AORTOGRAM    (Results Pending)       no X-Ray or EKG requiring interpretation  CT scan from outside facility, no report attached    Assessment/Plan:  I anticipate this patient will require at least two midnights for appropriate medical management, necessitating inpatient admission.    * Femoral artery occlusion, right (HCC)- (present on admission)  Assessment & Plan  Patient describes significant right leg pain starting at 5 AM this morning, patient went to [facility].  Was found to have AAA with right femoral artery occlusion.  -Patient to go to the OR with vascular surgery, on heparin drip    Abdominal aortic aneurysm (AAA) without rupture (HCC)- (present on admission)  Assessment & Plan  Patient was a transfer from [facility] after findings on CT scan, concerns for right femoral artery occlusion from embolism of AAA  Spoke with Dr. Paez at bedside regards to patient's care.  Patient to go to the OR today for vascular repair of his  aneurysm.  Patient denied any family history of aneurysms  -Keep n.p.o. for procedure  -Pain control  -Patient is on heparin drip at this time, continue  -Vascular surgery recommended for patient and wife to speak with his family if there are any persons with symptoms, will need evaluation    JAMISON cardiac risk: 0.1% (Risk of MI or cardiac arrest, intraoperatively or up to 30 days post-op).  JAMISON respiratory risk: 3.1% (Risk of mechanical ventalation for > 48 hours post-op or reintubation in 30 days)  Revised Cardiac Risk Index: 6.0 % (30 day risk of death, MI or cardiac arrest)    Reviewed ECG obtained in ED, does not appear to have ST elevations, QTc appropriate for male, pending official read by cardiologist  Patient appears to be a LOW risk for reyes-operative complications    Elevated BP without diagnosis of hypertension- (present on admission)  Assessment & Plan  Patient has not seen a physician in over 10 years, not on antihypertensives at home  BP is elevated will have as needed antihypertensives    BMI less than 19,adult- (present on admission)  Assessment & Plan  Patient's BMI is 16.47, patient is sarcopenic.  Albumin 3.9.  Nutritional consult    Alkaline phosphatase elevation- (present on admission)  Assessment & Plan  Acute, this is likely reactive to current vascular issue  Repeat CMP in the morning    Hyperglycemia- (present on admission)  Assessment & Plan  Patient does not have a diagnosis of diabetes in the past  We will check A1c    Leukemoid reaction- (present on admission)  Assessment & Plan  Patient WBC count 20,000, likely reactive to current process from femoral artery occlusion  -Repeat CBC in the morning  -Patient does not appear to be septic or infectious, will hold antibiotics at this time      VTE prophylaxis: SCDs/TEDs and therapeutic anticoagulation with heparin gtt

## 2021-09-05 NOTE — ANESTHESIA TIME REPORT
Anesthesia Start and Stop Event Times     Date Time Event    9/5/2021 1123 Ready for Procedure     1148 Anesthesia Start     1520 Anesthesia Stop        Responsible Staff  09/05/21    Name Role Begin End    Mj Quinteros M.D. Anesth 1148 1520        Preop Diagnosis (Free Text):  Pre-op Diagnosis     Critical Right Leg Ischemia; 6.5 cm Abdominal Aortic Aneurysm.        Preop Diagnosis (Codes):    Post op Diagnosis  AAA (abdominal aortic aneurysm) (HCC)  Critical right leg ischemia 2/2 thrombus originating at AAA    Premium Reason  B. 1st Call    Comments:

## 2021-09-05 NOTE — ANESTHESIA POSTPROCEDURE EVALUATION
Patient: Pete Wilson    Procedure Summary     Date: 09/05/21 Room / Location: [place] [id number] / SURGERY [facility]    Anesthesia Start: 1148 Anesthesia Stop: 1520    Procedures:       INSERTION, ENDOVASCULAR STENT GRAFT,  6.5 CM AORTA, ABDOMINAL ANEURYSM; (Right Abdomen)      THROMBECTOMY (Right Leg Lower)      ANGIOGRAM (Right Leg Lower) Diagnosis: (Critical Right Leg Ischemia; 6.5 cm Abdominal Aortic Aneurysm.)    Surgeons: Tatum Paez M.D. Responsible Provider: Mj Quinteros M.D.    Anesthesia Type: general ASA Status: 4 - Emergent          Final Anesthesia Type: general  Last vitals  BP   Blood Pressure : 146/67, Arterial BP: 158/65    Temp   35.8 °C (96.5 °F)    Pulse   (!) 59   Resp   18    SpO2   98 %      Anesthesia Post Evaluation    Patient location during evaluation: PACU  Patient participation: complete - patient participated  Level of consciousness: awake and alert  Pain score: 0    Airway patency: patent  Anesthetic complications: no  Cardiovascular status: hemodynamically stable  Respiratory status: acceptable  Hydration status: euvolemic    PONV: none          No complications documented.     Nurse Pain Score: 0 (NPRS)

## 2021-09-05 NOTE — ANESTHESIA PROCEDURE NOTES
Peripheral IV    Date/Time: 9/5/2021 12:15 PM  Performed by: Mj Quinteros M.D.  Authorized by: Mj Quinteros M.D.     Size:  14 G  Laterality:  Left  Local Anesthetic:  None  Site Prep:  Alcohol  Technique:  Direct puncture  Attempts:  1  Difficult IV necessitating physician skill: IV access difficult

## 2021-09-05 NOTE — ED NOTES
Report to preop RN. COVID swab collected. Second lavender top collected and sent to lab.  Patient transported to preop

## 2021-09-05 NOTE — ED PROVIDER NOTES
"ED Provider Note      CHIEF COMPLAINT   Chief Complaint   Patient presents with   • Leg Pain       HPI   Pete Wilson is a 86 y.o. male who presents with right lower extremity pain.  Pain started about 5 AM.  Constant throbbing nonradiating diffuse right leg pain.  He went to another hospital.  He was identified to have a pulseless lower extremity as well as an abdominal pulsatile mass.  CT angiogram of the abdomen with lower extremity runoff was obtained.  By verbal report from the transferring provider the patient has thrombus in the common femoral artery.  He also has a 6 cm abdominal aortic aneurysm without evidence of complication.  Patient was given a heparin bolus and started on heparin infusion.  He was given analgesics.  Received fentanyl just prior to arrival.  He is not a very good historian.  He has not seen a doctor in over 10 years.  He denies having any medical problems.  He denies any abdominal pain, nausea, vomiting, fever, trauma.    REVIEW OF SYSTEMS   As per HPI, all other systems reviewed and negative    PAST MEDICAL HISTORY   Denies medical problems    SOCIAL HISTORY  Social History     Tobacco Use   • Smoking status: Not on file   Substance Use Topics   • Alcohol use: Not on file   • Drug use: Not on file       ALLERGIES   See chart    PHYSICAL EXAM  VITAL SIGNS: BP (!) 177/83   Pulse 77   Temp 36.6 °C (97.9 °F) (Temporal)   Resp 18   Ht 1.88 m (6' 2\")   Wt 58.2 kg (128 lb 4.9 oz)   SpO2 98%   BMI 16.47 kg/m²   Head: Atraumatic  Eyes: Eyes normal inspection  Neck: has full range of motion, normal inspection.  Constitutional: No acute distress   Cardiovascular: Normal heart rate. Pulses strong left dorsalis pedis pulse.  Cannot palpate right dorsalis pedis pulse or posterior tibial pulse  Thorax & Lungs: No respiratory distress  Abdomen: Pulsatile mass identified.  This is nontender.  Skin: The skin of the right lower extremity is cyanotic and cool when compared to the " left.  Musculoskeletal: No asymmetric swelling.  Abnormal skin findings as above.  Compartments soft.  Neurologic: Decreased sensation over the right lower extremity.  Both his legs seem somewhat weak with dorsiflexion and plantarflexion of the foot.  This is greater on the right than on the left.    RADIOLOGY/PROCEDURES  CT angiogram as noted above was uploaded into the system and reviewed.    Results for orders placed or performed during the hospital encounter of 09/05/21   CBC WITH DIFFERENTIAL   Result Value Ref Range    WBC 20.0 (H) 4.8 - 10.8 K/uL    RBC 4.53 (L) 4.70 - 6.10 M/uL    Hemoglobin 14.7 14.0 - 18.0 g/dL    Hematocrit 44.6 42.0 - 52.0 %    MCV 98.5 (H) 81.4 - 97.8 fL    MCH 32.5 27.0 - 33.0 pg    MCHC 33.0 (L) 33.7 - 35.3 g/dL    RDW 44.3 35.9 - 50.0 fL    Platelet Count 223 164 - 446 K/uL    MPV 11.6 9.0 - 12.9 fL    Neutrophils-Polys 86.40 (H) 44.00 - 72.00 %    Lymphocytes 5.00 (L) 22.00 - 41.00 %    Monocytes 7.50 0.00 - 13.40 %    Eosinophils 0.20 0.00 - 6.90 %    Basophils 0.20 0.00 - 1.80 %    Immature Granulocytes 0.70 0.00 - 0.90 %    Nucleated RBC 0.00 /100 WBC    Neutrophils (Absolute) 17.28 (H) 1.82 - 7.42 K/uL    Lymphs (Absolute) 0.99 (L) 1.00 - 4.80 K/uL    Monos (Absolute) 1.50 (H) 0.00 - 0.85 K/uL    Eos (Absolute) 0.03 0.00 - 0.51 K/uL    Baso (Absolute) 0.04 0.00 - 0.12 K/uL    Immature Granulocytes (abs) 0.14 (H) 0.00 - 0.11 K/uL    NRBC (Absolute) 0.00 K/uL   COMP METABOLIC PANEL   Result Value Ref Range    Sodium 139 135 - 145 mmol/L    Potassium 4.1 3.6 - 5.5 mmol/L    Chloride 102 96 - 112 mmol/L    Co2 25 20 - 33 mmol/L    Anion Gap 12.0 7.0 - 16.0    Glucose 164 (H) 65 - 99 mg/dL    Bun 18 8 - 22 mg/dL    Creatinine 0.72 0.50 - 1.40 mg/dL    Calcium 8.5 8.5 - 10.5 mg/dL    AST(SGOT) 23 12 - 45 U/L    ALT(SGPT) 11 2 - 50 U/L    Alkaline Phosphatase 101 (H) 30 - 99 U/L    Total Bilirubin 0.6 0.1 - 1.5 mg/dL    Albumin 3.9 3.2 - 4.9 g/dL    Total Protein 6.6 6.0 - 8.2 g/dL     Globulin 2.7 1.9 - 3.5 g/dL    A-G Ratio 1.4 g/dL   APTT   Result Value Ref Range    APTT 224.2 (HH) 24.7 - 36.0 sec   PROTHROMBIN TIME (INR)   Result Value Ref Range    PT 16.2 (H) 12.0 - 14.6 sec    INR 1.34 (H) 0.87 - 1.13   Heparin Xa (Unfractionated)   Result Value Ref Range    Heparin Xa (UFH) 0.94 IU/mL   ESTIMATED GFR   Result Value Ref Range    GFR If African American >60 >60 mL/min/1.73 m 2    GFR If Non African American >60 >60 mL/min/1.73 m 2        COURSE & MEDICAL DECISION MAKING  Patient presents with acute arterial occlusion of the right lower extremity.  He was also identified to have a large abdominal aortic aneurysm without evidence of complication.  Continue heparin vascular rolando continuegery, Dr. Paez was called on arrival.    I reviewed the case as described to me with Dr. Paez.  He stated he would see the patient.  We will continue heparin.  Requested hospitalist admission.  I do not have any laboratory data transferred with the patient.  Will obtain preoperative data.    FINAL IMPRESSION  1.  Acute arterial occlusion, right lower extremity  2.  Abdominal aortic aneurysm    CRITICAL CARE TIME 30 minutes  There was a very real possibility of deterioration of the patient's condition.  This patient required the highest level of care.  I provided critical care services which included: review of the medical record, treatment orders, ordering and reviewing test results, frequent reevaluation of the patient's condition and response to treatment, as well as discussing the case with appropriate personnel and various consultants. The critical care time associated with the care of this patient is exclusive of any procedures or specific interventions.  This dictation was created using voice recognition software. The accuracy of the dictation is limited to the abilities of the software. I expect there may be some errors of grammar and possibly content. The nursing notes were reviewed and certain aspects  of this information were incorporated into this note.      Electronically signed by: Kalen Gamboa M.D., 9/5/2021 10:05 AM

## 2021-09-05 NOTE — OP REPORT
VASCULAR SURGERY OPERATIVE NOTE        DATE OF SERVICE:  09/05/2021     PREOPERATIVE DIAGNOSIS:  Critical right leg ischemia secondary to arterial   thromboembolism from 6.5 cm abdominal aortic aneurysm.     POSTOPERATIVE DIAGNOSIS:  Critical right leg ischemia secondary to arterial   thromboembolism from 6.5 cm abdominal aortic aneurysm.     PROCEDURES:  1.  Bilateral femoral artery exposure.  2.  Right superficial femoral, popliteal, and profunda femoral arterial   thromboembolectomy.  3.  Stent graft repair of symptomatic 6.5 cm abdominal aortic aneurysm using   Medtronic Endurant devices:  [id number] mm main device via left femoral   approach, [id number] mm right iliac limb, and [id number] mm left iliac limb.  4.  Right leg angiogram.  5.  Direct repair of left common femoral artery.     INDICATIONS FOR PROCEDURE:  This is a pleasant 86-year-old male who had not   seen a doctor for a number of years, who presented to the emergency room with   new onset of right lower extremity pain and discoloration.  CTA was obtained   and showed a 6.5 cm abdominal aortic aneurysm as well as a thrombus in the   common femoral artery on the right side.  The patient was transferred to   [facility].  Discussion was made with the patient and his wife.    They would like to undergo above procedure for limb salvage as well as to   prevent recurrent thromboembolism, fully understanding all risks.     DESCRIPTION OF PROCEDURE:  Informed consent was obtained.  The patient was   taken to the endovascular suite and was placed in the supine position.  He was   given Ancef intravenously.  General anesthesia was induced.  A Goodman catheter   was placed under sterile condition.     Next, his abdomen, both legs and perineum were sterilely prepped and draped in   the normal fashion.  A timeout procedure was done.  An incision was made in   the right groin, centering over the common femoral arteries.  The incision was   extended through  subcutaneous tissue using the electrocautery.  The common   femoral, profunda femoral, and superficial femoral artery was identified and   carefully dissected.     Next, an incision was also made on the left groin, centering over the common   femoral artery.  The incision was extended through subcutaneous tissue using   the electrocautery.  The common femoral artery was identified and carefully   dissected.     The patient was given heparin 5000 units intravenously.  After the heparin was   allowed to circulate systemically for 3 minutes, vascular control of the   proximal common femoral artery on the right side was obtained with vascular   clamps.  A transverse arteriotomy was made on the distal common femoral   artery.  There was thrombus seen, which was evacuated.  The thrombus was   organized.  Thromboembolectomy of the right superficial femoral and popliteal   artery was performed using a #3 Vanesa thromboembolectomy catheter.    Organized thrombus was evacuated.  Good back-bleeding was seen.  Vascular   control of the superficial femoral artery was obtained with vessel loops.     Next, thromboembolectomy of the right profunda femoral artery was also   performed using a #3 Vanesa thromboembolectomy catheter.  Small amount of   organized thrombus was evacuated.  Good back-bleeding was seen.  The   arteriotomy was repaired with interrupted 5-0 Prolene sutures.  Prior to   completing the repair, back-bleeding and flushing were obtained.  The repair   was completed. Flow was first restored into the profunda femoral artery followed by into the superficial femoral artery.     Next, the right common femoral artery was cannulated above the   thromboembolectomy site using an access needle.  A guidewire was passed   through the needle.  The needle was removed and replaced with a 12-[place] Islander   sheath.  Over the guidewire, an Omni flush catheter was advanced into the   abdominal aorta and positioned at L2 vertebral  level.     On the left side, the common femoral artery was also cannulated using an   access needle.  A guidewire was passed through the needle.  The needle was   removed and replaced with a 12-Israeli sheath.  The guidewire was replaced with   a Lunderquist wire.  A marker catheter was advanced over the   Lunderquist wire.     An abdominal aortogram was performed with the image intensifier angulated at   12-degree craniocaudal and  10-degree NORRIS.  There was a single renal artery   on each side with the left renal artery being the lower most renal artery.    There was a large infrarenal abdominal aortic aneurysm seen with a long neck,   at least 3-4 cm in length.     Next, the marker catheter and 12-Israeli sheath were removed over the   Lunderquist wire on the left side.  Over the Lunderquist wire on the left   side, a [id number] mm main device was advanced into the arterial system and   successfully deployed with approximately 3 cm fixation above the aneurysm with   the contralateral gate opened to the patient's right side.     Next, cannulation of the contralateral gate was performed from the right side.    Successful cannulation of the contralateral gate was verified by spinning a   pigtail angiographic catheter in the body of the stent graft device.     Next, retrograde right iliac angiogram was obtained with the image intensifier   angulated at 25-degree NATHALIE and a marker catheter in place.  The location of the hypogastric artery was   identified.  Based on the marker catheter, it was determined that a 124 mm long device would be needed.  The marker catheter as well as the 12 Israeli sheath were removed over the Lunderquist wire on the   right side.  A [id number] mm right iliac limb was advanced over the   Lunderquist wire and deployed under fluoroscopic guidance with appropriate   overlapping with the main device.  The delivery system was removed and   replaced with a 12-Israeli sheath.     Next, the main device was  completely deployed.  The delivery system was   removed and replaced with a 12-Swazi sheath.  A retrograde iliac angiogram   was obtained with the image intensifier angulated at 25-degree NORRIS and a marker catheter in place.  Based on   the marker catheter, it was determined that 124 mm long device would be needed   as well.  The marker catheter and 12-Swazi sheath were removed over the   Lunderquist wire on the left side.  A [id number] mm left iliac limb was   advanced into the arterial system and successfully deployed under fluoroscopic   guidance with appropriate overlapping with the main device.  The delivery   system was removed and replaced with a 12-Swazi sheath.      Angioplasty of the devices was then performed using a Reliant balloon.  A completion angiogram was   then obtained with a soft wire in place and showed excellent fixation of the stent graft devices.    Proximally, the stent graft was located approximately 2 cm below the level of   the left renal arteries; however, at the fixation length was at least 3 cm in   length.  Distally, they were located just above the iliac bifurcations.  There   was no endoleak identified.     The sheaths were removed and the arteriotomies were repaired with interrupted 5-0   Prolene sutures.  Prior to completing the repair, back-bleeding and flushing   were obtained.  The repairs were completed.  Flow was restored distally.  Excellent Doppler flow signals were obtained over the common femoral arteries bilaterally.     Next, the right common femoral artery on the right was cannulated using a   micropuncture kit.  A right leg angiogram was obtained through the   microcatheter.  There was prompt flow into the common femoral and profunda   femoral artery as well as into the superficial femoral artery.  The   superficial femoral artery was found to be diffusely diseased with moderate   stenosis at the adductor canal.  The popliteal artery was also diffusively   diseased.   The anterior tibial artery is the dominant runoff vessel.  The   microcatheter was removed and the puncture sites were repaired with 6-0   Prolene suture.     The wounds were irrigated and were found to have excellent hemostasis.  The   wounds were anesthetized with 20 mL of 0.25% Marcaine with epinephrine   solution.  The wounds were closed subcutaneously in layers with running 3-0   Vicryl suture and subcuticularly with 4-0 Monocryl suture.  The wounds were   cleaned and Dermabond was applied.     ESTIMATED BLOOD LOSS:  200 mL.     INTRAVENOUS FLUIDS:  1.75 liter crystalloids.     CONTRAST USED:  58 mL Visipaque.     Sponge and instrument count was correct x2.     The patient was then awakened, extubated, and taken to recovery area in stable   condition with palpable bilateral dorsalis pedis pulses with multiphasic   Doppler flow signals.  His compartments remained soft.        ______________________________  Tatum Paez MD    TQDAISY/DUSTIN    DD:  09/05/2021 16:09  DT:  09/05/2021 16:57    Job#:  [id number]

## 2021-09-05 NOTE — ASSESSMENT & PLAN NOTE
-WBC 20K on admission, trending down s/p vascular intervention  -Patient does not appear to be septic or infectious, likely was reactive leukocytosis 2/2 acute critical limb ischemia

## 2021-09-05 NOTE — ANESTHESIA PREPROCEDURE EVALUATION
Anes H&P:  PAST MEDICAL HISTORY:   86 y.o. male who presents for Procedure(s) (LRB):  INSERTION, ENDOVASCULAR STENT GRAFT, AORTA, ABDOMINAL  THROMBECTOMY (Right).  He has current and past medical problems significant for:    No past medical history on file.    SMOKING/ALCOHOL/RECREATIONAL DRUG USE:  Social History     Tobacco Use   • Smoking status: Former Smoker     Types: Cigarettes   • Tobacco comment: quit 20 years ago   Vaping Use   • Vaping Use: Never used   Substance Use Topics   • Alcohol use: Never   • Drug use: Never     Social History     Substance and Sexual Activity   Drug Use Never       PAST SURGICAL HISTORY:  No past surgical history on file.    ALLERGIES:   No Known Allergies    MEDICATIONS:  No current facility-administered medications on file prior to encounter.     No current outpatient medications on file prior to encounter.       LABS:  Lab Results   Component Value Date/Time    HEMOGLOBIN 14.7 09/05/2021 1012    HEMATOCRIT 44.6 09/05/2021 1012    WBC 20.0 (H) 09/05/2021 1012     Lab Results   Component Value Date/Time    SODIUM 139 09/05/2021 1012    POTASSIUM 4.1 09/05/2021 1012    CHLORIDE 102 09/05/2021 1012    CO2 25 09/05/2021 1012    GLUCOSE 164 (H) 09/05/2021 1012    BUN 18 09/05/2021 1012    CALCIUM 8.5 09/05/2021 1012       COVID-19 Status: Negative      PREVIOUS ANESTHETICS: See EMR  __________________________________________      Relevant Problems   CARDIAC   (positive) Abdominal aortic aneurysm (AAA) without rupture (HCC)   (positive) Femoral artery occlusion, right (HCC)       Physical Exam    Airway   Mallampati: II  TM distance: >3 FB  Neck ROM: full       Cardiovascular - normal exam  Rhythm: regular  Rate: normal  (-) murmur     Dental - normal exam           Pulmonary - normal exam  Breath sounds clear to auscultation     Abdominal    Neurological - normal exam                 Anesthesia Plan    ASA 4- EMERGENT (symptomatic AAA)   ASA physical status 4 criteria: other  (comment)ASA physical status emergent criteria: compromised vital organ, limb or tissue    Plan - general       Airway plan will be ETT          Induction: intravenous    Postoperative Plan: Postoperative administration of opioids is intended.    Pertinent diagnostic labs and testing reviewed    Informed Consent:    Anesthetic plan and risks discussed with patient.    Use of blood products discussed with: patient whom consented to blood products.

## 2021-09-05 NOTE — OR NURSING
1519: Pt arrived from OR, handoff received from anesthesiologist and RN. Incision to B/L groins with dermabond C/D/I and soft. B/L post tibial and pedal pulses detected with doppler.     1538: Call made to patient's spouse to update patient status.     1712: Awaiting be placement. Phase 1 complete.

## 2021-09-05 NOTE — ANESTHESIA PROCEDURE NOTES
Central Venous Line  Performed by: Mj Quinteros M.D.  Authorized by: Mj Quinteros M.D.     Start Time:  9/5/2021 12:00 PM  End Time:  9/5/2021 12:05 PM  Patient Location:  OR  Indication: central venous access        provider hand hygiene performed prior to central venous catheter insertion, all 5 sterile barriers used (gloves, gown, cap, mask, large sterile drape) during central venous catheter insertion and skin prep agent completely dried prior to procedure    Patient Position:  Trendelenburg  Laterality:  Right  Site:  Basilic  Prep:  Chlorhexidine  Catheter Size:  7 Fr  Catheter Length (cm):  5  Catheter Type:  Introducer  Number of Lumens:  Single lumen  target vein identified, needle advanced into vein and blood aspirated and guidewire advanced into vein    Seldinger Technique?: Yes    Ultrasound-Guided: ultrasound-guided  Image captured, interpreted and electronically stored.  Sterile Gel and Probe Cover Used for Ultrasound?: Yes    Intravenous Verification: verified by ultrasound, venous blood return and chest x-ray pending    all ports aspirated, all ports flushed easily, guidewire was removed intact, biopatch was applied, line was sutured in place and dressing was applied    Events: patient tolerated procedure well with no complications    PA Catheter Placed?: No

## 2021-09-05 NOTE — ED TRIAGE NOTES
"Chief Complaint   Patient presents with   • Leg Pain     85 yo male bib [facility] as a transfer from [facility] for [facility]. Patient reports at 0500 this morning he had pain in his R foot/leg. Patient noted his foot was pale and cool, and called EMS. After evaluation at [facility], patient found to have R femoral arterial occlusion.   Per EMS - patient given 0.5mg dilaudid, 100mcg fentanyl and 4mg zofran prior to arrival. Patient c/0 10/10 pain in R leg.   RLE pulses heard with doppler, but unable to palpate.     BP (!) 177/83   Pulse 77   Temp 36.6 °C (97.9 °F) (Temporal)   Resp 18   Ht 1.88 m (6' 2\")   Wt 58.2 kg (128 lb 4.9 oz)   SpO2 98%   BMI 16.47 kg/m²     "

## 2021-09-05 NOTE — OR SURGEON
VASCULAR SURGERY IMMEDIATE POST OP NOTE      PreOp Diagnosis: Critical right leg ischemia secondary to arterial thromboembolism from AAA.      PostOp Diagnosis: Same.      Procedure(s):  1) R leg arterial thrombectomy.  2) Stent graft repair of symptomatic 6.5cm AAA using Medtronic Endurant devices:  25 x 14 x 103mm main device via left femoral approach, 16 x 124 x 16mm R iliac limb, and 16 x 124 x13mm left iliac limb.  3) R leg angiogram.    Wound Class: Clean      Surgeon(s):  Tatum Paez M.D.    Assistant: Ludwig Dasilva MD    Anesthesiologist/Type of Anesthesia:  Anesthesiologist: Mj Quinteros M.D./General    Surgical Staff:  Circulator: Kamila Brooks R.N.; Riddhi Christina R.N.  Relief Scrub: Rodney Paniagua  Scrub Person: Dania Haider  First Assist: Ludwig Dasilva M.D.  Radiology Technologist: Larry Finch III; Brian Lopez    Specimens removed if any:  Clots removed, not sent.  * No specimens in log *    Estimated Blood Loss: 200ml.    IVF: 1.75l.    Contrast: 58ml Visipaque.      Findings: Post thrombectomy angiogram showed diseased R SFA with moderate stenosis at adductor canal.  AT is dominant run off.    Complications: None.    Dictated, #[id number].        9/5/2021 3:48 PM Tatum Paez M.D.

## 2021-09-05 NOTE — ED NOTES
Reddy from Lab called with critical result of PTT = 224 at 1052. Critical lab result read back to Reddy.   Primary RN Ashley notified of critical lab result at 1053.

## 2021-09-06 LAB
25(OH)D3 SERPL-MCNC: 35 NG/ML (ref 30–100)
ALBUMIN SERPL BCP-MCNC: 3.2 G/DL (ref 3.2–4.9)
ALBUMIN/GLOB SERPL: 1.3 G/DL
ALP SERPL-CCNC: 75 U/L (ref 30–99)
ALT SERPL-CCNC: 15 U/L (ref 2–50)
ANION GAP SERPL CALC-SCNC: 10 MMOL/L (ref 7–16)
AST SERPL-CCNC: 34 U/L (ref 12–45)
BASOPHILS # BLD AUTO: 0.1 % (ref 0–1.8)
BASOPHILS # BLD: 0.02 K/UL (ref 0–0.12)
BILIRUB SERPL-MCNC: 0.2 MG/DL (ref 0.1–1.5)
BUN SERPL-MCNC: 18 MG/DL (ref 8–22)
CALCIUM SERPL-MCNC: 8.4 MG/DL (ref 8.5–10.5)
CHLORIDE SERPL-SCNC: 104 MMOL/L (ref 96–112)
CO2 SERPL-SCNC: 26 MMOL/L (ref 20–33)
CREAT SERPL-MCNC: 0.73 MG/DL (ref 0.5–1.4)
EOSINOPHIL # BLD AUTO: 0 K/UL (ref 0–0.51)
EOSINOPHIL NFR BLD: 0 % (ref 0–6.9)
ERYTHROCYTE [DISTWIDTH] IN BLOOD BY AUTOMATED COUNT: 45 FL (ref 35.9–50)
GLOBULIN SER CALC-MCNC: 2.5 G/DL (ref 1.9–3.5)
GLUCOSE SERPL-MCNC: 139 MG/DL (ref 65–99)
HCT VFR BLD AUTO: 35.9 % (ref 42–52)
HGB BLD-MCNC: 11.8 G/DL (ref 14–18)
IMM GRANULOCYTES # BLD AUTO: 0.13 K/UL (ref 0–0.11)
IMM GRANULOCYTES NFR BLD AUTO: 0.7 % (ref 0–0.9)
LYMPHOCYTES # BLD AUTO: 1.04 K/UL (ref 1–4.8)
LYMPHOCYTES NFR BLD: 5.5 % (ref 22–41)
MAGNESIUM SERPL-MCNC: 2.2 MG/DL (ref 1.5–2.5)
MCH RBC QN AUTO: 32 PG (ref 27–33)
MCHC RBC AUTO-ENTMCNC: 32.9 G/DL (ref 33.7–35.3)
MCV RBC AUTO: 97.3 FL (ref 81.4–97.8)
MONOCYTES # BLD AUTO: 1.74 K/UL (ref 0–0.85)
MONOCYTES NFR BLD AUTO: 9.2 % (ref 0–13.4)
NEUTROPHILS # BLD AUTO: 15.96 K/UL (ref 1.82–7.42)
NEUTROPHILS NFR BLD: 84.5 % (ref 44–72)
NRBC # BLD AUTO: 0 K/UL
NRBC BLD-RTO: 0 /100 WBC
PHOSPHATE SERPL-MCNC: 3.6 MG/DL (ref 2.5–4.5)
PLATELET # BLD AUTO: 177 K/UL (ref 164–446)
PMV BLD AUTO: 11.9 FL (ref 9–12.9)
POTASSIUM SERPL-SCNC: 4.3 MMOL/L (ref 3.6–5.5)
PROT SERPL-MCNC: 5.7 G/DL (ref 6–8.2)
RBC # BLD AUTO: 3.69 M/UL (ref 4.7–6.1)
SODIUM SERPL-SCNC: 140 MMOL/L (ref 135–145)
WBC # BLD AUTO: 18.9 K/UL (ref 4.8–10.8)

## 2021-09-06 PROCEDURE — 84100 ASSAY OF PHOSPHORUS: CPT

## 2021-09-06 PROCEDURE — 85025 COMPLETE CBC W/AUTO DIFF WBC: CPT

## 2021-09-06 PROCEDURE — 97166 OT EVAL MOD COMPLEX 45 MIN: CPT

## 2021-09-06 PROCEDURE — 700102 HCHG RX REV CODE 250 W/ 637 OVERRIDE(OP): Performed by: SURGERY

## 2021-09-06 PROCEDURE — 700102 HCHG RX REV CODE 250 W/ 637 OVERRIDE(OP): Performed by: INTERNAL MEDICINE

## 2021-09-06 PROCEDURE — 82306 VITAMIN D 25 HYDROXY: CPT

## 2021-09-06 PROCEDURE — A9270 NON-COVERED ITEM OR SERVICE: HCPCS | Performed by: INTERNAL MEDICINE

## 2021-09-06 PROCEDURE — 97161 PT EVAL LOW COMPLEX 20 MIN: CPT

## 2021-09-06 PROCEDURE — A9270 NON-COVERED ITEM OR SERVICE: HCPCS | Performed by: SURGERY

## 2021-09-06 PROCEDURE — 80053 COMPREHEN METABOLIC PANEL: CPT

## 2021-09-06 PROCEDURE — 770006 HCHG ROOM/CARE - MED/SURG/GYN SEMI*

## 2021-09-06 PROCEDURE — 36415 COLL VENOUS BLD VENIPUNCTURE: CPT

## 2021-09-06 PROCEDURE — 83735 ASSAY OF MAGNESIUM: CPT

## 2021-09-06 PROCEDURE — 99232 SBSQ HOSP IP/OBS MODERATE 35: CPT | Mod: GC | Performed by: INTERNAL MEDICINE

## 2021-09-06 PROCEDURE — A9270 NON-COVERED ITEM OR SERVICE: HCPCS

## 2021-09-06 PROCEDURE — 700111 HCHG RX REV CODE 636 W/ 250 OVERRIDE (IP): Performed by: SURGERY

## 2021-09-06 PROCEDURE — 97535 SELF CARE MNGMENT TRAINING: CPT

## 2021-09-06 PROCEDURE — 97116 GAIT TRAINING THERAPY: CPT

## 2021-09-06 PROCEDURE — 700102 HCHG RX REV CODE 250 W/ 637 OVERRIDE(OP)

## 2021-09-06 RX ORDER — ATORVASTATIN CALCIUM 40 MG/1
40 TABLET, FILM COATED ORAL EVERY EVENING
Status: DISCONTINUED | OUTPATIENT
Start: 2021-09-06 | End: 2021-09-09 | Stop reason: HOSPADM

## 2021-09-06 RX ADMIN — CEFAZOLIN SODIUM 2 G: 2 INJECTION, SOLUTION INTRAVENOUS at 17:17

## 2021-09-06 RX ADMIN — CEFAZOLIN SODIUM 2 G: 2 INJECTION, SOLUTION INTRAVENOUS at 10:40

## 2021-09-06 RX ADMIN — DOCUSATE SODIUM 50 MG AND SENNOSIDES 8.6 MG 2 TABLET: 8.6; 5 TABLET, FILM COATED ORAL at 17:17

## 2021-09-06 RX ADMIN — CEFAZOLIN SODIUM 2 G: 2 INJECTION, SOLUTION INTRAVENOUS at 02:02

## 2021-09-06 RX ADMIN — ATORVASTATIN CALCIUM 40 MG: 40 TABLET, FILM COATED ORAL at 17:17

## 2021-09-06 RX ADMIN — METOPROLOL TARTRATE 25 MG: 25 TABLET, FILM COATED ORAL at 17:17

## 2021-09-06 ASSESSMENT — COGNITIVE AND FUNCTIONAL STATUS - GENERAL
TOILETING: A LOT
CLIMB 3 TO 5 STEPS WITH RAILING: A LITTLE
SUGGESTED CMS G CODE MODIFIER DAILY ACTIVITY: CK
MOBILITY SCORE: 24
DRESSING REGULAR UPPER BODY CLOTHING: A LITTLE
SUGGESTED CMS G CODE MODIFIER MOBILITY: CJ
HELP NEEDED FOR BATHING: A LOT
MOBILITY SCORE: 22
DAILY ACTIVITIY SCORE: 24
DRESSING REGULAR LOWER BODY CLOTHING: A LITTLE
SUGGESTED CMS G CODE MODIFIER DAILY ACTIVITY: CH
WALKING IN HOSPITAL ROOM: A LITTLE
SUGGESTED CMS G CODE MODIFIER MOBILITY: CH
DAILY ACTIVITIY SCORE: 17
PERSONAL GROOMING: A LITTLE

## 2021-09-06 ASSESSMENT — ENCOUNTER SYMPTOMS
DIARRHEA: 0
NAUSEA: 0
VOMITING: 0
SPUTUM PRODUCTION: 0
SENSORY CHANGE: 0
MYALGIAS: 0
ORTHOPNEA: 0
WEAKNESS: 0
DOUBLE VISION: 0
DIZZINESS: 0
CLAUDICATION: 0
SORE THROAT: 0
CONSTIPATION: 0
BLOOD IN STOOL: 0
COUGH: 0
HEARTBURN: 0
FEVER: 0
WHEEZING: 0
FLANK PAIN: 0
SHORTNESS OF BREATH: 0
ABDOMINAL PAIN: 0
HEADACHES: 0
PALPITATIONS: 0
BLURRED VISION: 0
CHILLS: 0

## 2021-09-06 ASSESSMENT — GAIT ASSESSMENTS
DEVIATION: BRADYKINETIC
GAIT LEVEL OF ASSIST: SUPERVISED
ASSISTIVE DEVICE: FRONT WHEEL WALKER
DISTANCE (FEET): 100

## 2021-09-06 ASSESSMENT — PAIN DESCRIPTION - PAIN TYPE
TYPE: ACUTE PAIN

## 2021-09-06 ASSESSMENT — LIFESTYLE VARIABLES
CONSUMPTION TOTAL: NEGATIVE
ALCOHOL_USE: NO
EVER FELT BAD OR GUILTY ABOUT YOUR DRINKING: NO
ON A TYPICAL DAY WHEN YOU DRINK ALCOHOL HOW MANY DRINKS DO YOU HAVE: 0
DOES PATIENT WANT TO STOP DRINKING: NO
TOTAL SCORE: 0
HAVE PEOPLE ANNOYED YOU BY CRITICIZING YOUR DRINKING: NO
EVER HAD A DRINK FIRST THING IN THE MORNING TO STEADY YOUR NERVES TO GET RID OF A HANGOVER: NO
TOTAL SCORE: 0
AVERAGE NUMBER OF DAYS PER WEEK YOU HAVE A DRINK CONTAINING ALCOHOL: 0
HAVE YOU EVER FELT YOU SHOULD CUT DOWN ON YOUR DRINKING: NO
HOW MANY TIMES IN THE PAST YEAR HAVE YOU HAD 5 OR MORE DRINKS IN A DAY: 0
TOTAL SCORE: 0

## 2021-09-06 ASSESSMENT — ACTIVITIES OF DAILY LIVING (ADL): TOILETING: INDEPENDENT

## 2021-09-06 NOTE — CARE PLAN
The patient is Stable  Clinical Goals: hemodynamic stability and safety  Patient Goals: rest  Family Goals: no family present    Progress made toward(s) clinical / shift goals:  Pt reports comfort after interventions, pt resting, no injuries noted, VSS, precautions in place, pt educated on POC, pt verbalized understanding.     Problem: Knowledge Deficit - Standard  Goal: Patient and family/care givers will demonstrate understanding of plan of care, disease process/condition, diagnostic tests and medications  Outcome: Progressing     Problem: Skin Integrity  Goal: Skin integrity is maintained or improved  Outcome: Progressing     Problem: Hemodynamics  Goal: Patient's hemodynamics, fluid balance and neurologic status will be stable or improve  Outcome: Progressing     Problem: Fall Risk  Goal: Patient will remain free from falls  Outcome: Progressing

## 2021-09-06 NOTE — PROGRESS NOTES
This RN contacted MD (Dr Paez) about pt's bloody output on his louis catheter. Per MD, continue to monitor, no new orders received, Pt AO4, breathing normal, VSS (see flowsheet). Precautions in place. Will continue to follow.

## 2021-09-06 NOTE — DISCHARGE SUMMARY
Discharge Summary    Date of Admission: 9/5/2021  Date of Discharge: 09/09/2021  Discharging Attending: Yoselyn Wyatt M.D.   Discharging Senior Resident: Dr. Chauhan  Discharging Intern: Dr. Perez    CHIEF COMPLAINT ON ADMISSION  Chief Complaint   Patient presents with   • Leg Pain       Reason for Admission  Critical right limb ischemia and abdominal aortic aneurysm    Admission Date  9/5/2021    CODE STATUS  Full Code    HPI & HOSPITAL COURSE  This is a 86 y.o. male here with no significant past medical history (not seen physician for years) who presented to OSH at [facility] with critical limb ischemia of RLE with CTA showing occlusion of distal right common femoral artery and 6.5 cm AAA. Transferred to [facility] with heparin drip on 9/5/21 for vascular surgery evaluation. Patient underwent right superficial femoral, popliteal artery and profunda femoral arterial thromboembolectomy and stent graft repair of symptomatic 6.5 cm AAA with Fillm devices on 9/5/21 with Dr. Tatum Paez. Post thrombectomy angiogram showed disease R SFA with moderate stenosis at adductor canal and AT is dominant run off. Evaluated by PT/OT recommending home health on discharge.       #Acute right limb ischemia  -Caused by thromboembolism secondary to 6.5cm AAA  -Quit smoking 20 years ago  -A1c 5.5%, LDL 59  -Sinus rhythm on EKG, no documented history of Afib  -s/p thrombectomy and stent repair of AAA on 9/5/21 - tolerated procedure well  Plan:  -Aspirin 81mg daily  -Atorvastatin 40mg daily  -Follow up with vascular surgery with Dr. Paez outpatient in 1 week    #Abdominal aortic aneurysm  -6.5 cm AAA s/p stent repair on 9/5/21 - tolerated procedure well  Plan:  -Continue anti-platelet regimen as above  -BP goal with beta blocker therapy -120 mmHg  -Continue Metoprolol tartrate 25mg BID  -Follow up with vascular surgery outpatient, will need repeat CTA at 1 month and 12 months post repair    #Allergic  rhinitis  Plan:  -Continue fluticasone nasal spray    Therefore, he is discharged in fair and stable condition to home with organized home healthcare and close outpatient follow-up.    The patient met 2-midnight criteria for an inpatient stay at the time of discharge.    PHYSICAL EXAM ON DISCHARGE  Temp:  [35.8 °C (96.5 °F)-36.7 °C (98 °F)] (P) 36.7 °C (98 °F)  Pulse:  [52-85] (P) 73  Resp:  [12-33] (P) 16  BP: (102-146)/(45-67) (P) 112/60  SpO2:  [93 %-99 %] (P) 96 %    Physical Exam  Vitals and nursing note reviewed.   Constitutional:       General: He is not in acute distress.     Appearance: He is not diaphoretic.   HENT:      Head: Normocephalic and atraumatic.      Mouth/Throat:      Mouth: Mucous membranes are moist.   Eyes:      Extraocular Movements: Extraocular movements intact.      Conjunctiva/sclera: Conjunctivae normal.      Pupils: Pupils are equal, round, and reactive to light.   Cardiovascular:      Rate and Rhythm: Normal rate and regular rhythm.      Pulses: Normal pulses.      Heart sounds: No murmur heard.   No friction rub. No gallop.    Pulmonary:      Effort: Pulmonary effort is normal. No respiratory distress.      Breath sounds: No wheezing, rhonchi or rales.   Abdominal:      General: Abdomen is flat. There is no distension.      Palpations: Abdomen is soft.      Tenderness: There is no abdominal tenderness. There is no guarding.   Musculoskeletal:         General: No swelling or tenderness.      Cervical back: Neck supple.      Right lower leg: No edema.      Left lower leg: No edema.      Comments: Able to ambulate on floor with walker   Skin:     General: Skin is warm and dry.      Capillary Refill: Capillary refill takes less than 2 seconds.   Neurological:      General: No focal deficit present.      Mental Status: He is alert and oriented to person, place, and time.   Psychiatric:         Mood and Affect: Mood normal.         Discharge Date  09/09/2021    FOLLOW UP ITEMS POST  DISCHARGE  -Follow up with PCP regarding ongoing     DISCHARGE DIAGNOSES  Principal Problem:    Femoral artery occlusion, right (HCC) POA: Yes  Active Problems:    Abdominal aortic aneurysm (AAA) without rupture (HCC) POA: Yes    Constipation POA: Unknown    Elevated BP without diagnosis of hypertension POA: Yes    Acute hypoxemic respiratory failure (HCC) POA: Unknown    Leukemoid reaction POA: Yes    Hyperglycemia POA: Yes    BMI less than 19,adult POA: Yes  Resolved Problems:    * No resolved hospital problems. *      FOLLOW UP  [facility]  [address]  [place] [facility] [address]  [phone]        [facility]  [address]  [place] [id number] [phone]    On 9/10/2021  Appointment is scheduled for 1 PM to establish with a primary doctor for home health care. Please arrive 15 minutes early.      MEDICATIONS ON DISCHARGE     Medication List      START taking these medications      Instructions   aspirin 81 MG EC tablet  Start taking on: September 10, 2021   Take 1 Tablet by mouth every day.  Dose: 81 mg     atorvastatin 40 MG Tabs  Commonly known as: LIPITOR   Take 1 Tablet by mouth every evening.  Dose: 40 mg     fluticasone 50 MCG/ACT nasal spray  Start taking on: September 10, 2021  Commonly known as: FLONASE   Administer 2 Sprays into affected nostril(S) every day.  Dose: 2 Spray     metoprolol tartrate 25 MG Tabs  Commonly known as: LOPRESSOR   Take 1 Tablet by mouth 2 times a day.  Dose: 25 mg            Allergies  No Known Allergies    DIET  Orders Placed This Encounter   Procedures   • Diet Order Diet: Regular     Standing Status:   Standing     Number of Occurrences:   1     Order Specific Question:   Diet:     Answer:   Regular [1]       ACTIVITY  As tolerated and directed by rehab.  Weight bearing as tolerated    CONSULTATIONS  Dr. Tatum Paez with Vascular Surgery    PROCEDURES  9/5/21: Dr. Tatum Paez   1) R leg arterial  thrombectomy.  2) Stent graft repair of symptomatic 6.5cm AAA using Medtronic Endurant devices:  25 x 14 x 103mm main device via left femoral approach, 16 x 124 x 16mm R iliac limb, and 16 x 124 x13mm left iliac limb.  3) R leg angiogram.

## 2021-09-06 NOTE — PROGRESS NOTES
4 Eyes Skin Assessment Completed by Jessica HARPER RN and LENA Bush.    Head WDL  Ears WDL  Nose WDL  Mouth WDL  Neck WDL  Breast/Chest WDL  Shoulder Blades WDL  Spine scab on posterior back (leftside)  (R) Arm/Elbow/Hand WDL  (L) Arm/Elbow/Hand WDL  Abdomen WDL  Groin Incision x 2, dermabond PAOLA CDI  Scrotum/Coccyx/Buttocks WDL  (R) Leg WDL  (L) Leg WDL  (R) Heel/Foot/Toe WDL  (L) Heel/Foot/Toe WDL          Devices In Places Nasal Cannula, SCDs      Interventions In Place Gray Ear Foams, pillows for positioning    Possible Skin Injury No    Pictures Uploaded Into Epic N/A  Wound Consult Placed N/A  RN Wound Prevention Protocol Ordered No

## 2021-09-06 NOTE — THERAPY
Missed Therapy     Patient Name: Pete Wilson  Age:  86 y.o., Sex:  male  Medical Record #: [id number]  Today's Date: 9/6/2021    Discussed missed therapy with [facility]    OT consult rec'd. Pt on strict bedrest. Will hold and reattempt as appropriate/able.

## 2021-09-06 NOTE — PROGRESS NOTES
"Vascular surgery      S: Awake and alert   No complaints    O: Right leg warm well perfused, palpable pedal pulse    /45   Pulse 72   Temp 36.7 °C (98 °F) (Temporal)   Resp 17   Ht 1.88 m (6' 2\")   Wt 58.2 kg (128 lb 4.9 oz)   SpO2 96%     Intake/Output Summary (Last 24 hours) at 9/6/2021 1031  Last data filed at 9/5/2021 1900  Gross per 24 hour   Intake 2000 ml   Output 1025 ml   Net 975 ml     Recent Labs     09/05/21  1012 09/06/21  0530   WBC 20.0* 18.9*   RBC 4.53* 3.69*   HEMOGLOBIN 14.7 11.8*   HEMATOCRIT 44.6 35.9*   MCV 98.5* 97.3   MCH 32.5 32.0   MCHC 33.0* 32.9*   RDW 44.3 45.0   PLATELETCT 223 177   MPV 11.6 11.9     Recent Labs     09/05/21  1012 09/06/21  0530   SODIUM 139 140   POTASSIUM 4.1 4.3   CHLORIDE 102 104   CO2 25 26   GLUCOSE 164* 139*   BUN 18 18   CREATININE 0.72 0.73   CALCIUM 8.5 8.4*     Recent Labs     09/05/21  1012 09/06/21  0530   SODIUM 139 140   POTASSIUM 4.1 4.3   CHLORIDE 102 104   CO2 25 26   GLUCOSE 164* 139*   BUN 18 18     Recent Labs     09/05/21  1012   APTT 224.2*   INR 1.34*           Current Facility-Administered Medications   Medication Dose   • senna-docusate (PERICOLACE or SENOKOT S) 8.6-50 MG per tablet 2 Tablet  2 Tablet    And   • polyethylene glycol/lytes (MIRALAX) PACKET 1 Packet  1 Packet    And   • magnesium hydroxide (MILK OF MAGNESIA) suspension 30 mL  30 mL    And   • bisacodyl (DULCOLAX) suppository 10 mg  10 mg   • labetalol (NORMODYNE/TRANDATE) injection 10 mg  10 mg   • ondansetron (ZOFRAN ODT) dispertab 4 mg  4 mg   • Pharmacy Consult Request ...Pain Management Review 1 Each  1 Each   • ondansetron (ZOFRAN) syringe/vial injection 4 mg  4 mg   • enoxaparin (LOVENOX) inj 40 mg  40 mg   • ceFAZolin in dextrose (ANCEF) IVPB premix 2 g  2 g   • aspirin EC (ECOTRIN) tablet 81 mg  81 mg   • metoprolol tartrate (LOPRESSOR) tablet 25 mg  25 mg   • acetaminophen (TYLENOL) tablet 500 mg  500 mg   • HYDROcodone-acetaminophen (NORCO) 5-325 MG per " tablet 1-2 Tablet  1-2 Tablet   • morphine (pf) 4 mg/mL injection 1-3 mg  1-3 mg       A:  Active Hospital Problems    Diagnosis    • Abdominal aortic aneurysm (AAA) without rupture (HCC) [I71.4]    • Leukemoid reaction [D72.823]    • Hyperglycemia [R73.9]    • Alkaline phosphatase elevation [R74.8]    • BMI less than 19,adult [Z68.1]    • Femoral artery occlusion, right (HCC) [I70.201]    • Elevated BP without diagnosis of hypertension [R03.0]        P: Postop day 1 status post    1) R leg arterial thrombectomy.  2) Stent graft repair of symptomatic 6.5cm AAA using Medtronic Endurant devices:  25 x 14 x 103mm main device via left femoral approach, 16 x 124 x 16mm R iliac limb, and 16 x 124 x13mm left iliac limb.  3) R leg angiogram.       Patient doing well after thrombectomy and aneurysm repair  Out of bed ambulate  Postop care  Follow closely    Reddy Freeman MD

## 2021-09-06 NOTE — NON-PROVIDER
Internal Medicine Daily Progress Note  Note Author: AGATA Whelan-S2    Date of Service: 2021  Date of Admission: 2021   Primary Team: [facility]ple[facility]   Attending:Roger Ayers MD   Senior Resident: Dr. Chauhan     Name Pete Wilson       1935   Age/Sex 86 y.o. male   MRN [id number]   Code Status Full     Reason for interval visit  Femoral artery occlusion, right (HCC)    SUBJECTIVE:  Pete is a 86 y.o. male who presented on 2021 with RLE pain. Upon US and CT imaging, he was found to have a thromboelbolism likely 2/2 6.5 cm AAA. He was consented for surgery and is s/p:      1) R leg arterial thrombectomy.  2) Stent graft repair of symptomatic 6.5cm AAA using Medtronic Endurant devices:  25 x 14 x 103mm main device via left femoral approach, 16 x 124 x 16mm R iliac limb, and 16 x 124 x13mm left iliac limb.  3) R leg angiogram.    Pain is well controlled today. No changes over night. On tylenol on time, 4mg IM morphine, and zofran for nausea. His white count is trending down.     Consultants/Specialty:General Surgery     ROS:  Constitutional: Negative for malaise/fatigue, chills, fever and weight loss  HENT: Negative for congestion, ear discharge, nosebleeds, sinus pain and tinnitus    Eyes: Negative for double vision, photophobia and pain  Respiratory: Negative for hemoptysis, sputum production and shortness of breath    Cardiovascular: Negative for chest pain, palpitations, orthopnea, claudication and leg swelling   Gastrointestinal: Negative for abdominal pain, blood in stool, constipation, diarrhea, nausea and vomiting   Genitourinary: Negative for flank pain, frequency, hematuria and urgency  Musculoskeletal: Negative for back pain, joint pain and neck pain  Skin: Negative for bruises, rashes and discoloration  Neurological: Negative for tingling, tremors, sensory change, speech change, focal weakness and headaches  Psychiatric/Behavioral: Negative for depression,  sage and hallucinations      Vitals   Temp:  [35.8 °C (96.5 °F)-36.7 °C (98 °F)] 36.7 °C (98 °F)  Pulse:  [52-85] 72  Resp:  [12-33] 17  BP: (102-146)/(45-67) 103/45  SpO2:  [93 %-99 %] 96 %    Body mass index is 16.47 kg/m².    Physical Exam     General: Not in acute distress.     Appearance: Not ill-appearing. Not toxic-appearing.  HENT:      Head: Normocephalic and atraumatic.     Right Ear: External ear normal.     Left Ear: External ear normal.     Nose: Nose normal. No congestion.      Mouth: Mucous membranes are moist.      Pharynx: No oropharyngeal exudate.   Eyes:      General: No scleral icterus.     Extraocular Movements: Extraocular movements intact.      Pupils: Pupils are equal, round, and reactive to light.   Neck:      Musculoskeletal: Normal range of motion.   Cardiovascular:      Rate and Rhythm: Normal rate and regular rhythm.      Pulses: Normal pulses.      Heart sounds: Normal heart sounds. No murmur. No gallop.    Pulmonary:      Effort: Pulmonary effort is normal. No respiratory distress.      Breath sounds: Normal breath sounds. No wheezing or rales.   Abdominal:      General: There is no distension.      Palpations: Abdomen is soft.      Tenderness: There is no abdominal tenderness. There is no guarding.   Musculoskeletal:         General: No swelling or deformity.      Range of motion: Normal.     Right lower leg: No edema.      Left lower leg: No edema.   Skin:     Findings: No bruises or rashes present. No discoloration.   Neurological:      Mental status, orientation: Awake, alert and fully oriented.      Speech and language: speech is clear and fluent.     Memory: There is intact recollection of recent and remote events.      Cranial nerve exam: Pupils are 3-4 mm bilaterally and equally reactive to light and accommodation. Intact full EOM in all directions of gaze.      Face appears symmetric. Sensation in the face is intact to light touch. Uvula is midline. Palate elevates  symmetrically. Tongue is midline.      Motor exam: Strength is 5/5 in all extremities. Tone is normal. No abnormal movements were seen on exam.      Sensory exam: Normal sense of light touch and pinprick in all extremities.      Deep tendon reflexes: 2+ throughout with absent ankle jerks. There is no clonus.      Coordination: Normal finger-nose-finger. Normal rapidly alternating movements.   Psychiatric:         Mood and Affect: Mood and affect normal.    Labs/Imaging:  Recent/pertinent lab results & imaging reviewed.  Lab Results   Component Value Date/Time    WBC 18.9 (H) 09/06/2021 05:30 AM    RBC 3.69 (L) 09/06/2021 05:30 AM    HEMOGLOBIN 11.8 (L) 09/06/2021 05:30 AM    HEMATOCRIT 35.9 (L) 09/06/2021 05:30 AM    MCV 97.3 09/06/2021 05:30 AM    MCH 32.0 09/06/2021 05:30 AM    MCHC 32.9 (L) 09/06/2021 05:30 AM    MPV 11.9 09/06/2021 05:30 AM    NEUTSPOLYS 84.50 (H) 09/06/2021 05:30 AM    LYMPHOCYTES 5.50 (L) 09/06/2021 05:30 AM    MONOCYTES 9.20 09/06/2021 05:30 AM    EOSINOPHILS 0.00 09/06/2021 05:30 AM    BASOPHILS 0.10 09/06/2021 05:30 AM      Lab Results   Component Value Date/Time    SODIUM 140 09/06/2021 05:30 AM    POTASSIUM 4.3 09/06/2021 05:30 AM    CHLORIDE 104 09/06/2021 05:30 AM    CO2 26 09/06/2021 05:30 AM    GLUCOSE 139 (H) 09/06/2021 05:30 AM    BUN 18 09/06/2021 05:30 AM    CREATININE 0.73 09/06/2021 05:30 AM      Lab Results   Component Value Date/Time    PROTHROMBTM 16.2 (H) 09/05/2021 10:12 AM    INR 1.34 (H) 09/05/2021 10:12 AM        CT-FOREIGN FILM CAT SCAN   Final Result      IR-ABDOMINAL AORTOGRAM    (Results Pending)       Assessment/Plan   Pt is a 86 y.o. male s/p  R leg arterial thrombectomy. Stent graft repair of symptomatic 6.5cm AAA. He is doing well today.RLE pain has resolved. Recommend starting high intensity statin and routine monitoring every 6-12 months. Plan for discharge this evening/tomorrow.     AAA  6.5 AAA found incidentally upon work up of RLE pain. Stent was placed.  Recommend further monitoring for the next 6-12 months for growth over .5cm. Will start on statin and aspirin for prevention.   -Start high intensity statin  -Start ASA.   -Out patient follow up for routine CT/US of AA every 6-12 months.       RLE Ischemia 2/2 arterial thromboembolism  -S/p arterial thrombectomy  -Continue with ASA and statin    HTN  -Continue antihypertensive regiment out patient.     Leukemoid reaction   -Resolving, tending down. Likely secondary to ischemic event.

## 2021-09-06 NOTE — THERAPY
"Physical Therapy   Initial Evaluation     Patient Name: Pete Wilson  Age:  86 y.o., Sex:  male  Medical Record #: [id number]  Today's Date: 9/6/2021     Precautions  Precautions: Fall Risk  Comments: s/p AAA graft and thrombectomy    Assessment  Patient is 86 y.o. male s/p RLE arterial thrombectomy, stent graft repair of AAA, and RLE angiogram; no AROM restrictions per chart.  Patient was indep PTA and today presents with impaired balance, gait and activity tolerance. Anticipate he will progress to home with HHPT and assist from wife if he continues to mobilize with RN staff.  Will continue to follow while in house.     Plan    Recommend Physical Therapy 3 times per week until therapy goals are met for the following treatments:  Bed Mobility, Gait Training, Neuro Re-Education / Balance, Stair Training, Therapeutic Activities and Therapeutic Exercises    DC Equipment Recommendations: Front-Wheel Walker  Discharge Recommendations: Recommend home health for continued physical therapy services       Subjective    \"I'm doing OK\"     Objective       09/06/21 1400   Precautions   Precautions Fall Risk   Pain 0 - 10 Group   Therapist Pain Assessment 0;Post Activity Pain Same as Prior to Activity   Prior Living Situation   Prior Services Home-Independent   Housing / Facility [address]   Steps Into Home 1   Bathroom Set up Walk In Shower   Equipment Owned Tub / Shower Seat   Lives with - Patient's Self Care Capacity Spouse   Comments wife can assist as needed    Prior Level of Functional Mobility   Bed Mobility Independent   Transfer Status Independent   Ambulation Independent   Distance Ambulation (Feet)   (commuity distances )   Assistive Devices Used None   Stairs Independent   History of Falls   History of Falls No   Cognition    Cognition / Consciousness X   Level of Consciousness Alert   New Learning Impaired   Comments pleasant nad cooperative with poor attention and possibly [place]    Passive ROM Lower Body   Passive " ROM Lower Body WDL   Active ROM Lower Body    Active ROM Lower Body  WDL   Strength Lower Body   Lower Body Strength  X   Gross Strength Generalized Weakness, Equal Bilaterally   Sensation Lower Body   Lower Extremity Sensation   WDL   Strength Upper Body   Upper Body Strength  WDL   Balance Assessment   Sitting Balance (Static) Fair +   Sitting Balance (Dynamic) Fair   Standing Balance (Static) Fair -   Standing Balance (Dynamic) Fair -   Weight Shift Sitting Fair   Weight Shift Standing Fair   Comments posterior lean without use of FWW or HHA    Gait Analysis   Gait Level Of Assist Supervised   Assistive Device Front Wheel Walker   Distance (Feet) 100   # of Times Distance was Traveled 1   Deviation Bradykinetic   Bed Mobility    Supine to Sit Minimal Assist   Sit to Supine Minimal Assist   Scooting Minimal Assist   Functional Mobility   Sit to Stand Minimal Assist   Bed, Chair, Wheelchair Transfer Minimal Assist   Toilet Transfers Minimal Assist   How much difficulty does the patient currently have...   Turning over in bed (including adjusting bedclothes, sheets and blankets)? 4   Sitting down on and standing up from a chair with arms (e.g., wheelchair, bedside commode, etc.) 4   Moving from lying on back to sitting on the side of the bed? 4   How much help from another person does the patient currently need...   Moving to and from a bed to a chair (including a wheelchair)? 4   Need to walk in a hospital room? 3   Climbing 3-5 steps with a railing? 3   6 clicks Mobility Score 22   Activity Tolerance   Sitting Edge of Bed 10 min    Standing 10 min    Edema / Skin Assessment   Comments at risk for skin breakdown    Patient / Family Goals    Patient / Family Goal #1 to go home    Short Term Goals    Short Term Goal # 1 in 6 visits patient will demo all functional transfers Windy for safe DC home    Short Term Goal # 2 in 6 visits patient will ambulate 200' Windy for safe DC home    Short Term Goal # 3 in 6 visits  patient will navigate 1 stairs with Blas for safe DC home    Education Group   Education Provided Role of Physical Therapist;Gait Training;Use of Assistive Device   Role of Physical Therapist Patient Response Patient;Significant Other;Acceptance;Explanation;Demonstration;Verbal Demonstration   Gait Training Patient Response Patient;Acceptance;Explanation;Demonstration;Verbal Demonstration;Action Demonstration   Use of Assistive Device Patient Response Patient;Acceptance;Explanation;Demonstration;Verbal Demonstration;Action Demonstration   Problem List    Problems Impaired Transfers;Impaired Balance;Decreased Activity Tolerance;Safety Awareness Deficits / Cognition   Anticipated Discharge Equipment and Recommendations   DC Equipment Recommendations Front-Wheel Walker   Discharge Recommendations Recommend home health for continued physical therapy services

## 2021-09-06 NOTE — THERAPY
Occupational Therapy   Initial Evaluation     Patient Name: Pete Wilson  Age:  86 y.o., Sex:  male  Medical Record #: [id number]  Today's Date: 9/6/2021     Precautions  Precautions: Fall Risk  Comments: s/p AAA graft and thrombectomy    Assessment  Patient is 86 y.o. male s/p RLE arterial thrombectomy, stent graft repair of AAA, and RLE angiogram; no AROM restrictions per chart. Req FWW during functional ambulation and min A for ADLs/txfs. Edu pt and family on safety with FWW during ADLs/txfs, wife assistance, DME for BR, pursed lip breathing d/t desaturation during mobility, incentive spirometer, and home safety/setup. Reports wife able to assist PRN 24/7. Currently limited by decreased functional mobility, activity tolerance, cognition, balance, and pain which are currently affecting pt's ability to complete ADLs/IADLs at baseline. Will continue to follow.     Plan    Recommend Occupational Therapy 4 times per week until therapy goals are met for the following treatments:  Adaptive Equipment, Neuro Re-Education / Balance, Self Care/Activities of Daily Living, Therapeutic Activities and Therapeutic Exercises.    DC Equipment Recommendations: Grab Bar(s) in Tub / Shower  Discharge Recommendations: Recommend home health for continued occupational therapy services (as will likely progress while in house)      Objective       09/06/21 1402   Prior Living Situation   Prior Services Home-Independent   Housing / Facility 1 Story House   Steps Into Home 1   Bathroom Set up Walk In Shower;Shower Chair   Equipment Owned Tub / Shower Seat   Lives with - Patient's Self Care Capacity Spouse   Comments Reports spouse home and can assist PRN 24/7   Prior Level of ADL Function   Self Feeding Independent   Grooming / Hygiene Independent   Bathing Independent   Dressing Independent   Toileting Independent   Prior Level of IADL Function   Medication Management Independent   Laundry Independent   Kitchen Mobility Independent    Finances Independent   Home Management Independent   Shopping Independent   Prior Level Of Mobility Independent Without Device in Community;Independent Without Device in Home   Occupation (Pre-Hospital Vocational) Retired Due To Age   Precautions   Precautions Fall Risk   Comments s/p AAA graft and thrombectomy   Vitals   Vitals Comments pt desat to 86% with activity and unable to recover req re-don of O2, returned to low 90s. Edu on incentive spirometer   Pain 0 - 10 Group   Therapist Pain Assessment During Activity;Post Activity;Nurse Notified  (no c/o pain)   Cognition    Cognition / Consciousness X   Level of Consciousness Alert   New Learning Impaired   Comments pleasant and cooperative; req max v/cs for safety and edu on FWW/IS   Passive ROM Upper Body   Passive ROM Upper Body WDL   Active ROM Upper Body   Active ROM Upper Body  WDL   Strength Upper Body   Upper Body Strength  WDL   Balance Assessment   Sitting Balance (Static) Fair +   Sitting Balance (Dynamic) Fair   Standing Balance (Static) Fair -   Standing Balance (Dynamic) Fair -   Weight Shift Sitting Fair   Weight Shift Standing Fair   Comments w/fww; w/o posterior lean req mod A for balance   Bed Mobility    Supine to Sit Minimal Assist   Sit to Supine Minimal Assist   Scooting Minimal Assist   Comments use of therapist hand and for sequencing   ADL Assessment   Grooming   (declined need)   Lower Body Dressing Supervision  (socks; min A for pants)   Toileting Moderate Assist  (louis and constipated)   Comments edu on safety with FWW during ADLs/txfs and home safetly   How much help from another person does the patient currently need...   Putting on and taking off regular lower body clothing? 3   Bathing (including washing, rinsing, and drying)? 2   Toileting, which includes using a toilet, bedpan, or urinal? 2   Putting on and taking off regular upper body clothing? 3   Taking care of personal grooming such as brushing teeth? 3   Eating meals? 4    6 Clicks Daily Activity Score 17   Functional Mobility   Sit to Stand Minimal Assist   Bed, Chair, Wheelchair Transfer Minimal Assist   Toilet Transfers Minimal Assist   Transfer Method Stand Step   Mobility w/fww; improved balance with fww   Edema / Skin Assessment   Edema / Skin  Not Assessed   Activity Tolerance   Comments limited by fatigue    Patient / Family Goals   Patient / Family Goal #1 to go home   Short Term Goals   Short Term Goal # 1 LB dressing with SPV (pants)   Short Term Goal # 2 toilet txf with SPV    Short Term Goal # 3 proper use of FWW during ADLs/txfs w/o v/cs   Short Term Goal # 4 standing g/h with SPV   Education Group   Education Provided Role of Occupational Therapist;Transfers;Home Safety;Activities of Daily Living;Pathology of bedrest   Role of Occupational Therapist Patient Response Patient;Family;Acceptance;Explanation;Verbal Demonstration;Reinforcement Needed   Home Safety Patient Response Patient;Family;Acceptance;Explanation;Verbal Demonstration;Reinforcement Needed   Transfers Patient Response Patient;Family;Acceptance;Explanation;Reinforcement Needed;No Learning Evidence;Demonstration   ADL Patient Response Patient;Family;Acceptance;Explanation;Verbal Demonstration;Action Demonstration;Reinforcement Needed   Pathology of Bedrest Patient Response Patient;Acceptance;Explanation;Reinforcement Needed;No Learning Evidence;Family

## 2021-09-07 ENCOUNTER — APPOINTMENT (OUTPATIENT)
Dept: RADIOLOGY | Facility: MEDICAL CENTER | Age: 86
DRG: 268 | End: 2021-09-07
Attending: SURGERY
Payer: MEDICARE

## 2021-09-07 PROBLEM — K59.00 CONSTIPATION: Status: ACTIVE | Noted: 2021-09-07

## 2021-09-07 PROBLEM — R74.8 ALKALINE PHOSPHATASE ELEVATION: Status: RESOLVED | Noted: 2021-09-05 | Resolved: 2021-09-07

## 2021-09-07 PROBLEM — I71.40 ABDOMINAL AORTIC ANEURYSM (AAA) WITHOUT RUPTURE (HCC): Status: ACTIVE | Noted: 2021-09-05

## 2021-09-07 LAB
ALBUMIN SERPL BCP-MCNC: 3 G/DL (ref 3.2–4.9)
ALBUMIN/GLOB SERPL: 1.3 G/DL
ALP SERPL-CCNC: 72 U/L (ref 30–99)
ALT SERPL-CCNC: 16 U/L (ref 2–50)
ANION GAP SERPL CALC-SCNC: 9 MMOL/L (ref 7–16)
AST SERPL-CCNC: 56 U/L (ref 12–45)
BASOPHILS # BLD AUTO: 0.1 % (ref 0–1.8)
BASOPHILS # BLD: 0.02 K/UL (ref 0–0.12)
BILIRUB SERPL-MCNC: 0.3 MG/DL (ref 0.1–1.5)
BUN SERPL-MCNC: 18 MG/DL (ref 8–22)
CALCIUM SERPL-MCNC: 8.1 MG/DL (ref 8.5–10.5)
CHLORIDE SERPL-SCNC: 105 MMOL/L (ref 96–112)
CHOLEST SERPL-MCNC: 117 MG/DL (ref 100–199)
CO2 SERPL-SCNC: 27 MMOL/L (ref 20–33)
CREAT SERPL-MCNC: 0.7 MG/DL (ref 0.5–1.4)
EOSINOPHIL # BLD AUTO: 0 K/UL (ref 0–0.51)
EOSINOPHIL NFR BLD: 0 % (ref 0–6.9)
ERYTHROCYTE [DISTWIDTH] IN BLOOD BY AUTOMATED COUNT: 46.5 FL (ref 35.9–50)
GLOBULIN SER CALC-MCNC: 2.3 G/DL (ref 1.9–3.5)
GLUCOSE SERPL-MCNC: 99 MG/DL (ref 65–99)
HCT VFR BLD AUTO: 33.3 % (ref 42–52)
HDLC SERPL-MCNC: 42 MG/DL
HGB BLD-MCNC: 10.9 G/DL (ref 14–18)
IMM GRANULOCYTES # BLD AUTO: 0.1 K/UL (ref 0–0.11)
IMM GRANULOCYTES NFR BLD AUTO: 0.6 % (ref 0–0.9)
LDLC SERPL CALC-MCNC: 59 MG/DL
LYMPHOCYTES # BLD AUTO: 1.49 K/UL (ref 1–4.8)
LYMPHOCYTES NFR BLD: 9 % (ref 22–41)
MCH RBC QN AUTO: 32.5 PG (ref 27–33)
MCHC RBC AUTO-ENTMCNC: 32.7 G/DL (ref 33.7–35.3)
MCV RBC AUTO: 99.4 FL (ref 81.4–97.8)
MONOCYTES # BLD AUTO: 1.63 K/UL (ref 0–0.85)
MONOCYTES NFR BLD AUTO: 9.8 % (ref 0–13.4)
NEUTROPHILS # BLD AUTO: 13.4 K/UL (ref 1.82–7.42)
NEUTROPHILS NFR BLD: 80.5 % (ref 44–72)
NRBC # BLD AUTO: 0 K/UL
NRBC BLD-RTO: 0 /100 WBC
PLATELET # BLD AUTO: 174 K/UL (ref 164–446)
PMV BLD AUTO: 11.5 FL (ref 9–12.9)
POTASSIUM SERPL-SCNC: 4 MMOL/L (ref 3.6–5.5)
PROT SERPL-MCNC: 5.3 G/DL (ref 6–8.2)
RBC # BLD AUTO: 3.35 M/UL (ref 4.7–6.1)
SODIUM SERPL-SCNC: 141 MMOL/L (ref 135–145)
TRIGL SERPL-MCNC: 80 MG/DL (ref 0–149)
WBC # BLD AUTO: 16.6 K/UL (ref 4.8–10.8)

## 2021-09-07 PROCEDURE — A9270 NON-COVERED ITEM OR SERVICE: HCPCS | Performed by: STUDENT IN AN ORGANIZED HEALTH CARE EDUCATION/TRAINING PROGRAM

## 2021-09-07 PROCEDURE — A9270 NON-COVERED ITEM OR SERVICE: HCPCS | Performed by: INTERNAL MEDICINE

## 2021-09-07 PROCEDURE — A9270 NON-COVERED ITEM OR SERVICE: HCPCS | Performed by: SURGERY

## 2021-09-07 PROCEDURE — 700102 HCHG RX REV CODE 250 W/ 637 OVERRIDE(OP): Performed by: INTERNAL MEDICINE

## 2021-09-07 PROCEDURE — A9270 NON-COVERED ITEM OR SERVICE: HCPCS

## 2021-09-07 PROCEDURE — 770006 HCHG ROOM/CARE - MED/SURG/GYN SEMI*

## 2021-09-07 PROCEDURE — 85025 COMPLETE CBC W/AUTO DIFF WBC: CPT

## 2021-09-07 PROCEDURE — 80053 COMPREHEN METABOLIC PANEL: CPT

## 2021-09-07 PROCEDURE — 700111 HCHG RX REV CODE 636 W/ 250 OVERRIDE (IP): Performed by: SURGERY

## 2021-09-07 PROCEDURE — 80061 LIPID PANEL: CPT

## 2021-09-07 PROCEDURE — 700102 HCHG RX REV CODE 250 W/ 637 OVERRIDE(OP)

## 2021-09-07 PROCEDURE — 36415 COLL VENOUS BLD VENIPUNCTURE: CPT

## 2021-09-07 PROCEDURE — 99232 SBSQ HOSP IP/OBS MODERATE 35: CPT | Mod: GC | Performed by: HOSPITALIST

## 2021-09-07 PROCEDURE — 700102 HCHG RX REV CODE 250 W/ 637 OVERRIDE(OP): Performed by: SURGERY

## 2021-09-07 PROCEDURE — 700102 HCHG RX REV CODE 250 W/ 637 OVERRIDE(OP): Performed by: STUDENT IN AN ORGANIZED HEALTH CARE EDUCATION/TRAINING PROGRAM

## 2021-09-07 RX ORDER — BISACODYL 10 MG
10 SUPPOSITORY, RECTAL RECTAL DAILY
Status: DISCONTINUED | OUTPATIENT
Start: 2021-09-07 | End: 2021-09-09 | Stop reason: HOSPADM

## 2021-09-07 RX ORDER — BISACODYL 10 MG
10 SUPPOSITORY, RECTAL RECTAL
Status: DISCONTINUED | OUTPATIENT
Start: 2021-09-07 | End: 2021-09-09 | Stop reason: HOSPADM

## 2021-09-07 RX ORDER — FLUTICASONE PROPIONATE 50 MCG
2 SPRAY, SUSPENSION (ML) NASAL DAILY
Status: DISCONTINUED | OUTPATIENT
Start: 2021-09-07 | End: 2021-09-09 | Stop reason: HOSPADM

## 2021-09-07 RX ORDER — POLYETHYLENE GLYCOL 3350 17 G/17G
1 POWDER, FOR SOLUTION ORAL 2 TIMES DAILY
Status: DISCONTINUED | OUTPATIENT
Start: 2021-09-07 | End: 2021-09-09 | Stop reason: HOSPADM

## 2021-09-07 RX ORDER — AMOXICILLIN 250 MG
2 CAPSULE ORAL 2 TIMES DAILY
Status: DISCONTINUED | OUTPATIENT
Start: 2021-09-07 | End: 2021-09-09 | Stop reason: HOSPADM

## 2021-09-07 RX ADMIN — SENNOSIDES AND DOCUSATE SODIUM 2 TABLET: 50; 8.6 TABLET ORAL at 17:38

## 2021-09-07 RX ADMIN — POLYETHYLENE GLYCOL 3350 1 PACKET: 17 POWDER, FOR SOLUTION ORAL at 17:38

## 2021-09-07 RX ADMIN — FLUTICASONE PROPIONATE 100 MCG: 50 SPRAY, METERED NASAL at 14:46

## 2021-09-07 RX ADMIN — POLYETHYLENE GLYCOL 3350 1 PACKET: 17 POWDER, FOR SOLUTION ORAL at 05:39

## 2021-09-07 RX ADMIN — ATORVASTATIN CALCIUM 40 MG: 40 TABLET, FILM COATED ORAL at 17:38

## 2021-09-07 RX ADMIN — ONDANSETRON 4 MG: 2 INJECTION INTRAMUSCULAR; INTRAVENOUS at 08:04

## 2021-09-07 RX ADMIN — ENOXAPARIN SODIUM 40 MG: 40 INJECTION SUBCUTANEOUS at 05:38

## 2021-09-07 RX ADMIN — METOPROLOL TARTRATE 25 MG: 25 TABLET, FILM COATED ORAL at 17:38

## 2021-09-07 RX ADMIN — DOCUSATE SODIUM 50 MG AND SENNOSIDES 8.6 MG 2 TABLET: 8.6; 5 TABLET, FILM COATED ORAL at 05:39

## 2021-09-07 RX ADMIN — MAGNESIUM HYDROXIDE 30 ML: 400 SUSPENSION ORAL at 15:16

## 2021-09-07 RX ADMIN — METOPROLOL TARTRATE 25 MG: 25 TABLET, FILM COATED ORAL at 05:39

## 2021-09-07 RX ADMIN — ASPIRIN 81 MG: 81 TABLET, COATED ORAL at 05:39

## 2021-09-07 ASSESSMENT — ENCOUNTER SYMPTOMS
FEVER: 0
CHILLS: 0
ORTHOPNEA: 0
HEMOPTYSIS: 0
COUGH: 0
CONSTIPATION: 1
VOMITING: 0
BLOOD IN STOOL: 0
DIAPHORESIS: 0
WEAKNESS: 0
ABDOMINAL PAIN: 1
DIZZINESS: 0
NAUSEA: 1
PND: 0
SHORTNESS OF BREATH: 0
HEADACHES: 0
DOUBLE VISION: 0
CLAUDICATION: 0
PALPITATIONS: 0
LOSS OF CONSCIOUSNESS: 0
MYALGIAS: 0
DIARRHEA: 0
BLURRED VISION: 0
DEPRESSION: 0

## 2021-09-07 ASSESSMENT — PAIN DESCRIPTION - PAIN TYPE: TYPE: ACUTE PAIN

## 2021-09-07 NOTE — PROGRESS NOTES
Vascular surgery    Patient awake and alert  Patient reported some mild abdominal discomfort this morning but currently is asymptomatic.    Vital signs are normal  Abdomen soft  Palpable right pedal pulse  Incisions clean dry and intact    Labs reviewed    Urine has cleared    Impression-    Postop day 2 status post    1.  Bilateral femoral artery exposure.  2.  Right superficial femoral, popliteal, and profunda femoral arterial   thromboembolectomy.  3.  Stent graft repair of symptomatic 6.5 cm abdominal aortic aneurysm using   [employer] Endurant devices:  [id number] mm main device via left femoral   approach, [id number] mm right iliac limb, and [id number] mm left iliac limb.  4.  Right leg angiogram.  5.  Direct repair of left common femoral artery.    Plan-    DC Goodman  Advance diet  Dulcolax suppository  Out of bed ambulate  Anticipate discharge 1 to 2 days    Reddy Freeman MD

## 2021-09-07 NOTE — CARE PLAN
The patient is Watcher - Medium risk of patient condition declining or worsening    Shift Goals  Clinical Goals: Safety, mobility, hemodynamic stability   Patient Goals: rest  Family Goals: no family present    Progress made toward(s) clinical / shift goals:  Provide fall safety education. Patient verbalize understanding. Patient vital signs stable, hgb is also stable, labs are ordered to trend H&H, patient ambulated in hallway.       Problem: Hemodynamics  Goal: Patient's hemodynamics, fluid balance and neurologic status will be stable or improve  Outcome: Progressing     Problem: Fall Risk  Goal: Patient will remain free from falls  Outcome: Progressing

## 2021-09-07 NOTE — DISCHARGE PLANNING
Agency/Facility Name: [facility]  Spoke To: Josy  Outcome: Pt needs PCP for service to accept.    LSW informed

## 2021-09-07 NOTE — FACE TO FACE
Face to Face Note  -  Durable Medical Equipment    Rod Chauhan M.D. - NPI: [id number]  I certify that this patient is under my care and that they have had a durable medical equipment(DME)face to face encounter by myself that meets the physician DME face-to-face encounter requirements with this patient on:    Date of encounter:   Patient:                    MRN:                       YOB: 2020  Pete Wilson  [id number]  1935     The encounter with the patient was in whole, or in part, for the following medical condition, which is the primary reason for durable medical equipment:  Post-Op Surgery    I certify that, based on my findings, the following durable medical equipment is medically necessary:  Walkers.    HOME O2 Saturation Measurements:(Values must be present for Home Oxygen orders)       My Clinical findings support the need for the above equipment due to:  Other - Impaired balance, gait, and activity tolerance    Supporting Symptoms: Impaired balance and gait post operatively     ------------------------------------------------------------------------------------------------------------------    Face to Face Supporting Documentation - Home Health    The encounter with this patient was in whole or in part the primary reason for home health admission.    Date of encounter:   Patient:                    MRN:                       YOB: 2020  Pete Wilson  [id number]  1935     Home health to see patient for:  Physical Therapy evaluation and treatment and Occupational therapy evaluation and treatment    Skilled need for:  Surgical Aftercare Post-operative right leg arterial thrombectomy and stent graft reapir of AAA    Skilled nursing interventions to include:  Wound Care    Homebound evidenced status by:  Need the aid of supportive devices such as crutches, canes, wheelchairs or walkers. Leaving home must require a considerable and taxing effort. There  must exist a normal inability to leave the home.    Community Physician to provide follow up care: Pcp Pt States None     Optional Interventions    Wound information & treatment:    Home Infusion Therapy orders:    Line/Drain/Airway:    I certify the face to face encounter for this home care referral meets the CMS requirements and the encounter/clinical assessment with the patient was, in whole, or in part, for the medical condition(s) listed above, which is the primary reason for home health care. Based on my clinical findings: the service(s) are medically necessary, support the need for home health care, and the homebound criteria are met.  I certify that this patient has had a face to face encounter by myself.  Rod Chauhan M.D. - NPI: [id number]    *Debility, frailty and advanced age in the absence of an acute deterioration or exacerbation of a condition do not qualify a patient for home health.

## 2021-09-07 NOTE — DISCHARGE PLANNING
Anticipated Discharge Disposition: HH with home O2     Action/Information: SW met with pt and his wife at bedside. SW [facility]. Pt does not have a preference for 1st choice. Pt also made choice for home O2/DME for [facility] and [facility], although again, has no preference for 1st choice.     Update: CAYETANO updated by DPA that no PCP on file. SW met with pt and his wife at bedside, who state that PCP recently retired and they have not re-established care with new PCP. They request appointment be made for follow up at [facility]. New PCP appointment was made for 9/17 at 3pm with Dr. Daylin Cohen. In case this is too far out for HH services, SW also requested [facility] team to check for availability of appointments for pt so that HH can be established.     Barriers: Medical clearance, HH and DME acceptance. No PCP.      Plan: Pt to discharge to home when medically cleared and discharge planning complete. HCM team to follow up with [facility] regarding establishing temporary PCP with [facility].

## 2021-09-07 NOTE — DISCHARGE PLANNING
Received Choice form at 1200  Agency/Facility Name: [facility]  Referral sent per Choice form @ 1200       RN CM informed

## 2021-09-07 NOTE — CARE PLAN
The patient is Watcher - Medium risk of patient condition declining or worsening    Shift Goals  Clinical Goals: Mobility, safety   Patient Goals: rest  Family Goals: no family present    Progress made toward(s) clinical / shift goals:  Patient is steady, able to ambulate with minimal assistance.     Problem: Nutrition  Goal: Patient's nutritional and fluid intake will be adequate or improve  Outcome: Not Progressing   Patient has been experiencing nausea, and sinus issue today. MD aware, medications order to help with symptoms. Encourage patient to eat, and ambulate as much as possible.     Problem: Mobility  Goal: Patient's capacity to carry out activities will improve  Outcome: Progressing

## 2021-09-07 NOTE — CARE PLAN
Problem: Nutritional:  Goal: Achieve adequate nutritional intake  Description: Patient will consume 50% of meals and >75% of supplements, or >50-75% of meals.  Outcome: Progressing  See RD note.

## 2021-09-07 NOTE — DISCHARGE PLANNING
Received Choice form at 1200  Agency/Facility Name: [facility]  Referral sent per Choice form @ 1200      RN CM informed

## 2021-09-07 NOTE — PROGRESS NOTES
"1130: Goodman catheter removed per order. Patient stated that he does not want the suppository yet. RN educated patient on the importance of having regular bowel movement. Patient with bowel sounds, and passing gas. Hold off suppository for now.     1400: Patient complained of sinus issue: runny nose, teary eyes. Patient stated he can develop seasonal allergy with symptoms such as this, and take allergy pills at home.   Notified physician Gissel, Flonase was added for patient. No other order given.     1500: Patient has not urinate since catheter removal, stated \"I don't feel the urge yet\". RN encourage patient to take milk of magnesia to help with bowel movement, patient agrees, medication given. Patient also agrees to have suppository if constipation persists.     "

## 2021-09-07 NOTE — PROGRESS NOTES
Daily Progress Note:     Date of Service: 9/6/2021  Primary Team: [facility] Team   Attending: Roger Ayers M.D.   Senior Resident: Dr. Rod Chauhan M.D.  Intern: Dr. Asia Perez M.D.  Contact:  [phone]    Chief Complaint:   Right leg and foot pain    Interval Update:  Patient is s/p AAA repair thrombectomy in the setting of critical right leg ischemia secondary to arterial thromboembolism from AAA. This morning patient is doing much better and had no concerns of any kind. Patient states he has no pain. Noticed some hematuria in his Goodman catheter drainage bag. Otherwise denies any chest pain, palpitations, abdominal pain, nausea/vomiting, constipation or diarrhea. Patient has not had a bowel movement yet.    Consultants/Specialty:  Vascular surgery    Review of Systems:  Review of Systems   Constitutional: Negative for chills, fever and malaise/fatigue.   HENT: Negative for congestion and sore throat.    Eyes: Negative for blurred vision and double vision.   Respiratory: Negative for cough, sputum production, shortness of breath and wheezing.    Cardiovascular: Negative for chest pain, palpitations, orthopnea, claudication and leg swelling.   Gastrointestinal: Negative for abdominal pain, blood in stool, constipation, diarrhea, heartburn, nausea and vomiting.   Genitourinary: Positive for hematuria. Negative for dysuria, flank pain, frequency and urgency.   Musculoskeletal: Negative for myalgias.   Neurological: Negative for dizziness, sensory change, weakness and headaches.       Objective Data:   Physical Exam:   Vitals:   Temp:  [36.1 °C (97 °F)-36.7 °C (98 °F)] 36.4 °C (97.5 °F)  Pulse:  [52-75] 74  Resp:  [16-24] 16  BP: (102-149)/(45-66) 149/66  SpO2:  [95 %-99 %] 96 %    Physical Exam  Constitutional:       Appearance: Normal appearance. He is normal weight.   HENT:      Mouth/Throat:      Mouth: Mucous membranes are moist.      Pharynx: Oropharynx is clear.   Eyes:      Extraocular  Movements: Extraocular movements intact.      Conjunctiva/sclera: Conjunctivae normal.      Pupils: Pupils are equal, round, and reactive to light.   Cardiovascular:      Rate and Rhythm: Normal rate and regular rhythm.      Pulses: Normal pulses.      Heart sounds: Normal heart sounds.   Pulmonary:      Effort: Pulmonary effort is normal.      Breath sounds: Normal breath sounds.   Abdominal:      General: Abdomen is flat. Bowel sounds are normal.      Palpations: Abdomen is soft.   Genitourinary:     Penis: Normal.    Musculoskeletal:      Cervical back: Normal range of motion and neck supple.   Skin:     General: Skin is warm.      Capillary Refill: Capillary refill takes less than 2 seconds.   Neurological:      General: No focal deficit present.      Mental Status: He is alert and oriented to person, place, and time. Mental status is at baseline.   Psychiatric:         Mood and Affect: Mood normal.         Behavior: Behavior normal.         Labs:   Lab Results   Component Value Date/Time    WBC 18.9 (H) 09/06/2021 05:30 AM    RBC 3.69 (L) 09/06/2021 05:30 AM    HEMOGLOBIN 11.8 (L) 09/06/2021 05:30 AM    HEMATOCRIT 35.9 (L) 09/06/2021 05:30 AM    MCV 97.3 09/06/2021 05:30 AM    MCH 32.0 09/06/2021 05:30 AM    MCHC 32.9 (L) 09/06/2021 05:30 AM    MPV 11.9 09/06/2021 05:30 AM    NEUTSPOLYS 84.50 (H) 09/06/2021 05:30 AM    LYMPHOCYTES 5.50 (L) 09/06/2021 05:30 AM    MONOCYTES 9.20 09/06/2021 05:30 AM    EOSINOPHILS 0.00 09/06/2021 05:30 AM    BASOPHILS 0.10 09/06/2021 05:30 AM        Lab Results   Component Value Date/Time    SODIUM 140 09/06/2021 05:30 AM    POTASSIUM 4.3 09/06/2021 05:30 AM    CHLORIDE 104 09/06/2021 05:30 AM    CO2 26 09/06/2021 05:30 AM    GLUCOSE 139 (H) 09/06/2021 05:30 AM    BUN 18 09/06/2021 05:30 AM    CREATININE 0.73 09/06/2021 05:30 AM       Lab Results   Component Value Date/Time    PROTHROMBTM 16.2 (H) 09/05/2021 10:12 AM    INR 1.34 (H) 09/05/2021 10:12 AM        Imaging:   None    *  Femoral artery occlusion, right (HCC)- (present on admission)  Assessment & Plan  Patient describes significant right leg pain starting at 5 AM and went to [facility].  Was found to have AAA with right femoral artery occlusion.  Patient is s/p right femoral thrombectomy and AAA repair.  -Doing much better in the postop setting  -Patient started on aspirin and atorvastatin 40 mg  -Hematuria is most likely secondary to trauma during Goodman catheter insertion,/and continue to monitor hygroma-    Elevated BP without diagnosis of hypertension- (present on admission)  Assessment & Plan  Resolved  Patient has not seen a physician in over 10 years, not on antihypertensives at home  BP is elevated will have as needed antihypertensives    BMI less than 19,adult- (present on admission)  Assessment & Plan  Patient's BMI is 16.47, patient is sarcopenic.  Albumin 3.9.  Nutritional consult    Alkaline phosphatase elevation- (present on admission)  Assessment & Plan  Resolved  Acute, this is likely reactive to current vascular issue  Repeat CMP in the morning    Hyperglycemia- (present on admission)  Assessment & Plan  Patient does not have a diagnosis of diabetes in the past  A1c is 5.5  Continue to monitor    Leukemoid reaction- (present on admission)  Assessment & Plan  Patient WBC count 20,000, likely reactive to current process from femoral artery occlusion  -Repeat CBC in the morning  -Patient does not appear to be septic or infectious, will hold antibiotics at this time

## 2021-09-07 NOTE — ASSESSMENT & PLAN NOTE
-Has not had BM since prior to admission  -Passing flatus, likely secondary to post-anesthesia since procedure on 9/5/21  -Associated nausea without vomiting  Plan:  -Bisacodyl suppository  -Scheduled bowel regimen  -Will increase regimen as needed, continue to monitor for resolution  -Discontinued opioid pain meds, as patient was not needing them as pain resolved with procedure

## 2021-09-07 NOTE — CARE PLAN
Problem: Skin Integrity  Goal: Skin integrity is maintained or improved  Outcome: Progressing     Problem: Fall Risk  Goal: Patient will remain free from falls  Outcome: Progressing   The patient is Stable - Low risk of patient condition declining or worsening    Shift Goals  Clinical Goals: mobility  Patient Goals: rest  Family Goals: no family present    Progress made toward(s) clinical / shift goals:  ambulate patient, monitor urine via louis    Patient is not progressing towards the following goals:
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
## 2021-09-07 NOTE — PROGRESS NOTES
"Daily Progress Note:     Date of Service: 9/7/2021  Primary Team: [facility] IM Green Team   Attending: Yoselyn Wyatt M.D.   Senior Resident: Dr. Chauhan  Intern: Dr. Perez  Contact:  [phone]    Chief Complaint:   Right leg and foot pain    Subjective:  No acute events overnight  Patient reports he has still not had any BM's since prior to admission and is having symptoms of mild abdominal discomfort or \"indigestion\" with some associated nausea without vomiting.  He is passing flatus.  Otherwise no acute complaints at this time.  Louis still in place with resolution of bloody urine output. Plan to remove louis and monitor post-void residual.  Able to ambulate around the room, encourage increasing his ambulation as tolerated and use of his incentive spirometer.    Consultants/Specialty:  Vascular surgery    Review of Systems:    Review of Systems   Constitutional: Negative for chills, diaphoresis and fever.   HENT: Negative for hearing loss.    Eyes: Negative for blurred vision and double vision.   Respiratory: Negative for cough, hemoptysis and shortness of breath.    Cardiovascular: Negative for chest pain, palpitations, orthopnea, claudication, leg swelling and PND.   Gastrointestinal: Positive for abdominal pain, constipation and nausea. Negative for blood in stool, diarrhea, melena and vomiting.   Genitourinary: Negative for dysuria, hematuria and urgency.   Musculoskeletal: Negative for joint pain and myalgias.   Skin: Negative for rash.   Neurological: Negative for dizziness, loss of consciousness, weakness and headaches.   Psychiatric/Behavioral: Negative for depression.       Objective Data:   Physical Exam:   Vitals:   Temp:  [36.4 °C (97.5 °F)-37.3 °C (99.2 °F)] 36.8 °C (98.2 °F)  Pulse:  [73-80] 75  Resp:  [15-17] 17  BP: (127-164)/(61-67) 139/66  SpO2:  [95 %-97 %] 95 %    Physical Exam  Vitals and nursing note reviewed.   Constitutional:       General: He is not in acute distress.     Appearance: He is " not diaphoretic.   HENT:      Head: Normocephalic and atraumatic.      Nose: Nose normal.      Mouth/Throat:      Mouth: Mucous membranes are moist.   Eyes:      Extraocular Movements: Extraocular movements intact.      Conjunctiva/sclera: Conjunctivae normal.      Pupils: Pupils are equal, round, and reactive to light.   Cardiovascular:      Rate and Rhythm: Normal rate and regular rhythm.      Pulses: Normal pulses.      Heart sounds: Normal heart sounds.   Pulmonary:      Effort: Pulmonary effort is normal. No respiratory distress.      Breath sounds: No wheezing, rhonchi or rales.   Abdominal:      General: Abdomen is flat. Bowel sounds are normal. There is no distension.      Palpations: Abdomen is soft.      Tenderness: There is abdominal tenderness. There is no guarding.      Comments: Mild tenderness to palpation   Musculoskeletal:      Cervical back: Neck supple.      Right lower leg: No edema.      Left lower leg: No edema.   Skin:     General: Skin is warm.      Capillary Refill: Capillary refill takes less than 2 seconds.   Neurological:      General: No focal deficit present.      Mental Status: He is alert and oriented to person, place, and time.   Psychiatric:         Mood and Affect: Mood normal.           Labs:   Lab Results   Component Value Date/Time    WBC 16.6 (H) 09/07/2021 05:11 AM    RBC 3.35 (L) 09/07/2021 05:11 AM    HEMOGLOBIN 10.9 (L) 09/07/2021 05:11 AM    HEMATOCRIT 33.3 (L) 09/07/2021 05:11 AM    MCV 99.4 (H) 09/07/2021 05:11 AM    MCH 32.5 09/07/2021 05:11 AM    MCHC 32.7 (L) 09/07/2021 05:11 AM    MPV 11.5 09/07/2021 05:11 AM    NEUTSPOLYS 80.50 (H) 09/07/2021 05:11 AM    LYMPHOCYTES 9.00 (L) 09/07/2021 05:11 AM    MONOCYTES 9.80 09/07/2021 05:11 AM    EOSINOPHILS 0.00 09/07/2021 05:11 AM    BASOPHILS 0.10 09/07/2021 05:11 AM      Lab Results   Component Value Date/Time    SODIUM 141 09/07/2021 05:11 AM    POTASSIUM 4.0 09/07/2021 05:11 AM    CHLORIDE 105 09/07/2021 05:11 AM    CO2  27 09/07/2021 05:11 AM    GLUCOSE 99 09/07/2021 05:11 AM    BUN 18 09/07/2021 05:11 AM    CREATININE 0.70 09/07/2021 05:11 AM        Imaging:   CT-FOREIGN FILM CAT SCAN   Final Result      IR-ABDOMINAL AORTOGRAM    (Results Pending)       Problem Representation:     87 y/o M with no significant documented past medical history who presented with critical RLE limb ischemia secondary to arterial thrombus seeded from incidental AAA of 6.5 cm s/p right femoral thrombectomy and stent graft repair of AAA on 9/5/21 with vascular surgery continuing inpatient hospitalization for for post-operative care and PT/OT evaluation for discharge disposition.      * Femoral artery occlusion, right (HCC)- (present on admission)  Assessment & Plan  -Acute onset severe RLE pain 2/2 femoral arterial thrombus presumed from AAA  -No history of peripheral vascular disease, quit smoking 20 years ago  -Not on any therapy prior to admission  -s/p RLE thrombectomy  Plan:  -Continue ASA + Atorvastatin  -Pain resolved s/p intervention    Constipation  Assessment & Plan  -Has not had BM since prior to admission  -Passing flatus, likely secondary to post-anesthesia since procedure on 9/5/21  -Associated nausea without vomiting  Plan:  -Bisacodyl suppository  -Scheduled bowel regimen  -Will increase regimen as needed, continue to monitor for resolution  -Discontinued opioid pain meds, as patient was not needing them as pain resolved with procedure    Abdominal aortic aneurysm (AAA) without rupture (HCC)- (present on admission)  Assessment & Plan  -Patient was a transfer from [facility] after findings on CT scan, concerns for right femoral artery occlusion from embolism of AAA of 6.5 cm  -Patient denied any family history of aneurysms  -s/p RLE thrombectomy and AAA stent graft repair on 9/5/21  Plan:  -Continue strict BP control with goal of 105-120, on Metoprolol tartrate  -Will need vascular outpatient follow up and repeat CT angiography   -PT/OT  consulted post-operatively    Elevated BP without diagnosis of hypertension- (present on admission)  Assessment & Plan  -Patient has not seen a physician in over 10 years, not on antihypertensives at home  -In setting of AAA s/p stent graft repair would like stricter BP control  Plan:  -Continue Metoprolol as discussed in AAA section, goal -120 mmHg    BMI less than 19,adult- (present on admission)  Assessment & Plan  Patient's BMI is 16.47, patient is sarcopenic.  Albumin 3.9.  Nutritional consult    Hyperglycemia- (present on admission)  Assessment & Plan  -No prior history of DM  -During admission A1c 5.5%  -No pharmacological intervention necessary at this time    Leukemoid reaction- (present on admission)  Assessment & Plan  -WBC 20K on admission, trending down s/p vascular intervention  -Patient does not appear to be septic or infectious, likely was reactive leukocytosis 2/2 acute critical limb ischemia

## 2021-09-07 NOTE — PROGRESS NOTES
0929: Notify Dr. Freeman regarding patient's urine output.     1225: RN flush louis per order, and kept it in place for today, monitor urine output characteristics per Dr. Freeman. Emptied urine bag.    1400: Patient urine is clearing up, urine is dark yellow.     1730: RN checked patient urinary output, output - 200 ml of dark red color. This RN notified Dr. Freeman again, surgeon believe this is from trauma during louis catheter placement. Patient is tolerating diet and fluid, denies pain, vital signs stable. Dr. Freeman ordered for CMP and BMP in the AM, and continue to monitor output, no other order given.

## 2021-09-08 ENCOUNTER — APPOINTMENT (OUTPATIENT)
Dept: RADIOLOGY | Facility: MEDICAL CENTER | Age: 86
DRG: 268 | End: 2021-09-08
Payer: MEDICARE

## 2021-09-08 LAB
BASOPHILS # BLD AUTO: 0.2 % (ref 0–1.8)
BASOPHILS # BLD: 0.03 K/UL (ref 0–0.12)
EOSINOPHIL # BLD AUTO: 0 K/UL (ref 0–0.51)
EOSINOPHIL NFR BLD: 0 % (ref 0–6.9)
ERYTHROCYTE [DISTWIDTH] IN BLOOD BY AUTOMATED COUNT: 44.5 FL (ref 35.9–50)
HCT VFR BLD AUTO: 35.3 % (ref 42–52)
HGB BLD-MCNC: 11.5 G/DL (ref 14–18)
IMM GRANULOCYTES # BLD AUTO: 0.12 K/UL (ref 0–0.11)
IMM GRANULOCYTES NFR BLD AUTO: 0.7 % (ref 0–0.9)
LYMPHOCYTES # BLD AUTO: 1.56 K/UL (ref 1–4.8)
LYMPHOCYTES NFR BLD: 8.7 % (ref 22–41)
MCH RBC QN AUTO: 32.2 PG (ref 27–33)
MCHC RBC AUTO-ENTMCNC: 32.6 G/DL (ref 33.7–35.3)
MCV RBC AUTO: 98.9 FL (ref 81.4–97.8)
MONOCYTES # BLD AUTO: 1.76 K/UL (ref 0–0.85)
MONOCYTES NFR BLD AUTO: 9.8 % (ref 0–13.4)
NEUTROPHILS # BLD AUTO: 14.44 K/UL (ref 1.82–7.42)
NEUTROPHILS NFR BLD: 80.6 % (ref 44–72)
NRBC # BLD AUTO: 0 K/UL
NRBC BLD-RTO: 0 /100 WBC
PLATELET # BLD AUTO: 237 K/UL (ref 164–446)
PMV BLD AUTO: 11.1 FL (ref 9–12.9)
RBC # BLD AUTO: 3.57 M/UL (ref 4.7–6.1)
WBC # BLD AUTO: 17.9 K/UL (ref 4.8–10.8)

## 2021-09-08 PROCEDURE — 85025 COMPLETE CBC W/AUTO DIFF WBC: CPT

## 2021-09-08 PROCEDURE — 97530 THERAPEUTIC ACTIVITIES: CPT

## 2021-09-08 PROCEDURE — A9270 NON-COVERED ITEM OR SERVICE: HCPCS

## 2021-09-08 PROCEDURE — A9270 NON-COVERED ITEM OR SERVICE: HCPCS | Performed by: SURGERY

## 2021-09-08 PROCEDURE — 770006 HCHG ROOM/CARE - MED/SURG/GYN SEMI*

## 2021-09-08 PROCEDURE — 36415 COLL VENOUS BLD VENIPUNCTURE: CPT

## 2021-09-08 PROCEDURE — 71045 X-RAY EXAM CHEST 1 VIEW: CPT

## 2021-09-08 PROCEDURE — 700102 HCHG RX REV CODE 250 W/ 637 OVERRIDE(OP): Performed by: STUDENT IN AN ORGANIZED HEALTH CARE EDUCATION/TRAINING PROGRAM

## 2021-09-08 PROCEDURE — 97535 SELF CARE MNGMENT TRAINING: CPT

## 2021-09-08 PROCEDURE — 700102 HCHG RX REV CODE 250 W/ 637 OVERRIDE(OP)

## 2021-09-08 PROCEDURE — 99232 SBSQ HOSP IP/OBS MODERATE 35: CPT | Mod: GC | Performed by: HOSPITALIST

## 2021-09-08 PROCEDURE — 700102 HCHG RX REV CODE 250 W/ 637 OVERRIDE(OP): Performed by: SURGERY

## 2021-09-08 PROCEDURE — A9270 NON-COVERED ITEM OR SERVICE: HCPCS | Performed by: STUDENT IN AN ORGANIZED HEALTH CARE EDUCATION/TRAINING PROGRAM

## 2021-09-08 PROCEDURE — 700111 HCHG RX REV CODE 636 W/ 250 OVERRIDE (IP): Performed by: SURGERY

## 2021-09-08 PROCEDURE — 97116 GAIT TRAINING THERAPY: CPT

## 2021-09-08 RX ADMIN — ATORVASTATIN CALCIUM 40 MG: 40 TABLET, FILM COATED ORAL at 17:09

## 2021-09-08 RX ADMIN — POLYETHYLENE GLYCOL 3350 1 PACKET: 17 POWDER, FOR SOLUTION ORAL at 05:01

## 2021-09-08 RX ADMIN — FLUTICASONE PROPIONATE 100 MCG: 50 SPRAY, METERED NASAL at 05:02

## 2021-09-08 RX ADMIN — ENOXAPARIN SODIUM 40 MG: 40 INJECTION SUBCUTANEOUS at 05:00

## 2021-09-08 RX ADMIN — ASPIRIN 81 MG: 81 TABLET, COATED ORAL at 05:01

## 2021-09-08 RX ADMIN — METOPROLOL TARTRATE 25 MG: 25 TABLET, FILM COATED ORAL at 17:10

## 2021-09-08 RX ADMIN — SENNOSIDES AND DOCUSATE SODIUM 2 TABLET: 50; 8.6 TABLET ORAL at 05:01

## 2021-09-08 RX ADMIN — METOPROLOL TARTRATE 25 MG: 25 TABLET, FILM COATED ORAL at 05:01

## 2021-09-08 ASSESSMENT — COGNITIVE AND FUNCTIONAL STATUS - GENERAL
CLIMB 3 TO 5 STEPS WITH RAILING: A LITTLE
SUGGESTED CMS G CODE MODIFIER DAILY ACTIVITY: CJ
HELP NEEDED FOR BATHING: A LITTLE
MOBILITY SCORE: 22
SUGGESTED CMS G CODE MODIFIER MOBILITY: CJ
PERSONAL GROOMING: A LITTLE
TOILETING: A LITTLE
WALKING IN HOSPITAL ROOM: A LITTLE
DAILY ACTIVITIY SCORE: 20
DRESSING REGULAR LOWER BODY CLOTHING: A LITTLE

## 2021-09-08 ASSESSMENT — GAIT ASSESSMENTS
DEVIATION: DECREASED BASE OF SUPPORT
GAIT LEVEL OF ASSIST: SUPERVISED
DISTANCE (FEET): 125
ASSISTIVE DEVICE: NONE;FRONT WHEEL WALKER

## 2021-09-08 ASSESSMENT — PAIN DESCRIPTION - PAIN TYPE: TYPE: ACUTE PAIN

## 2021-09-08 NOTE — DISCHARGE PLANNING
SUSUW made phone call to [facility] and spoke with Jacqueline. Jacqueline reported that they received the referral and the walker will be delivered today.

## 2021-09-08 NOTE — THERAPY
"Physical Therapy   Daily Treatment     Patient Name: Pete Wilson  Age:  86 y.o., Sex:  male  Medical Record #: [id number]  Today's Date: 9/8/2021     Precautions  Precautions: Fall Risk  Comments: s/p AAA graft and thrombectomy    Assessment    Patient seen for follow up PT treatment session. Patient demos improved balance and mobility but is still needing supplemental O2 for all mobility. He desats to low 80s with short distance gait on RA.  He was bale to ambulate without AD but still need close supervision. Would do best with the FWW and HHPT upon DC and may need supplemental O2    Plan    Continue current treatment plan.    DC Equipment Recommendations: Front-Wheel Walker  Discharge Recommendations: Recommend home health for continued physical therapy services      Subjective    \"I'm doing better I think\"     Objective       09/08/21 0930   Precautions   Precautions Fall Risk   Vitals   Room Air Oximetry 83   Vitals Comments desats to 83% on RA while ambulating to the bathroom    Cognition    Cognition / Consciousness WDL   Level of Consciousness Alert   Comments pleasant and cooperative    Passive ROM Lower Body   Passive ROM Lower Body WDL   Active ROM Lower Body    Active ROM Lower Body  WDL   Strength Lower Body   Lower Body Strength  X   Gross Strength Generalized Weakness, Equal Bilaterally   Sitting Lower Body Exercises   Sitting Lower Body Exercises Yes   Long Arc Quad 1 set of 10;Bilateral   Neuro-Muscular Treatments   Neuro-Muscular Treatments Postural Changes;Verbal Cuing;Weight Shift Right;Weight Shift Left;Tactile Cuing   Comments cueing for reciprocal arm swing during gait    Other Treatments   Other Treatments Provided Education about the importance of being OOB and perfomring IS to improve lung function. Patient agreeable to be OOB for all meals and ambulate with RN staff    Balance   Sitting Balance (Static) Fair +   Sitting Balance (Dynamic) Fair   Standing Balance (Static) Fair -   Standing " Balance (Dynamic) Fair -   Weight Shift Sitting Fair   Weight Shift Standing Fair   Skilled Intervention Sequencing;Tactile Cuing;Verbal Cuing   Comments with and without FWW in hallway    Gait Analysis   Gait Level Of Assist Supervised   Assistive Device None;Front Wheel Walker   Distance (Feet) 125   # of Times Distance was Traveled 2   Deviation Decreased Base Of Support   Skilled Intervention Verbal Cuing;Tactile Cuing;Sequencing;Postural Facilitation   Comments preogressed to gait without FWW and cueing for reciprocal arm swing    Functional Mobility   Sit to Stand Minimal Assist   Bed, Chair, Wheelchair Transfer Minimal Assist   Toilet Transfers Minimal Assist   How much difficulty does the patient currently have...   Turning over in bed (including adjusting bedclothes, sheets and blankets)? 4   Sitting down on and standing up from a chair with arms (e.g., wheelchair, bedside commode, etc.) 4   Moving from lying on back to sitting on the side of the bed? 4   How much help from another person does the patient currently need...   Moving to and from a bed to a chair (including a wheelchair)? 4   Need to walk in a hospital room? 3   Climbing 3-5 steps with a railing? 3   6 clicks Mobility Score 22   Activity Tolerance   Sitting in Chair post session    Standing 10 min    Patient / Family Goals    Patient / Family Goal #1 to go home    Goal #1 Outcome Progressing as expected   Short Term Goals    Short Term Goal # 1 in 6 visits patient will demo all functional transfers Windy for safe DC home    Goal Outcome # 1 Progressing as expected   Short Term Goal # 2 in 6 visits patient will ambulate 200' Windy for safe DC home    Goal Outcome # 2 Goal met   Short Term Goal # 3 in 6 visits patient will navigate 1 stairs with Blas for safe DC home    Goal Outcome # 3 Progressing as expected   Anticipated Discharge Equipment and Recommendations   DC Equipment Recommendations Front-Wheel Walker   Discharge Recommendations  Recommend home health for continued physical therapy services     Nilda Avery, PT, DPT, GCS

## 2021-09-08 NOTE — DISCHARGE PLANNING
Agency/Facility Name: [facility]  Spoke To: Natalie  Outcome: Pt declined. Out of walkers.    LSW informed

## 2021-09-08 NOTE — PROGRESS NOTES
Vascular surgery    Awake and alert  No complaints  Urinating without difficulty  Denies abdominal pain  Tolerating diet  Small bowel movement    Palpable pedal pulse  Incisions intact    Doing well after right lower extremity thrombectomy and endovascular repair of his aortic aneurysm.    Okay to disposition from vascular surgery standpoint  We will follow along    Reddy Freeman MD

## 2021-09-08 NOTE — DISCHARGE PLANNING
Received [facility] form at 0930  Agency/Facility Name: [facility]  Referral sent per [facility] form @ 0930    LSW informed

## 2021-09-08 NOTE — THERAPY
Occupational Therapy  Daily Treatment     Patient Name: Pete Wilson  Age:  86 y.o., Sex:  male  Medical Record #: [id number]  Today's Date: 9/8/2021     Precautions  Precautions: Fall Risk  Comments: s/p AAA graft and thrombectomy    Assessment    Pt seen for OT session. Pt progressing with balance, activity tolerance, and functional mobility. Continues to desaturate to low 80%s on room air and is asymptomatic, but able to recover >90% with seated RB and 2L O2. Spent time educating on importance of OOB activity and sitting up in chair for all meals. Continued edu on home safety/setup, safety/proper use of FWW during txfs and with standing g/h, and adaptive techniques for ADLs. Reports wife will be available to assist 24/7. Continues to be limited by decreased functional mobility, activity tolerance, balance, adherence to precautions, and pain which are currently affecting pt's ability to complete ADLs/IADLs at baseline. Will continue to follow.     Plan    Continue current treatment plan.    DC Equipment Recommendations: Grab Bar(s) in Tub / Shower  Discharge Recommendations: Recommend home health for continued occupational therapy services (as long as wife able to assist PRN 24/7)    Objective     09/08/21 0853   Precautions   Precautions Fall Risk   Comments s/p AAA graft and thrombectomy   Vitals   Vitals Comments desats to low 80s during mobility on RA to BR/sink; req v/cs for pursed lip breathing to recover to >90%   Pain 0 - 10 Group   Therapist Pain Assessment Post Activity;During Activity;Nurse Notified  (no c/o pain)   Cognition    Cognition / Consciousness WDL   Level of Consciousness Alert   New Learning Impaired   Comments pleasant and cooperative. [place] and req v/cs for proper technique w/IS   Passive ROM Upper Body   Passive ROM Upper Body WDL   Active ROM Upper Body   Active ROM Upper Body  WDL   Strength Upper Body   Upper Body Strength  WDL   Other Treatments   Other Treatments Provided Spent time  educating on importance of OOB activity and sitting up in chair for all meals. Continued edu on home safety/setup, safety/proper use of FWW during txfs and with standing g/h, and adaptive techniques for ADLs.    Balance   Sitting Balance (Static) Fair +   Sitting Balance (Dynamic) Fair   Standing Balance (Static) Fair   Standing Balance (Dynamic) Fair   Weight Shift Sitting Good   Weight Shift Standing Fair   Skilled Intervention Verbal Cuing;Compensatory Strategies;Tactile Cuing   Comments w/ FWW   Bed Mobility    Supine to Sit Minimal Assist   Sit to Supine   (left in chair)   Scooting Supervised   Skilled Intervention Verbal Cuing;Compensatory Strategies;Tactile Cuing   Comments use of therapist hand   Activities of Daily Living   Grooming Supervision;Standing  (washing hands )   Lower Body Dressing Supervision  (socks, sim for pants)   Toileting Supervision   Skilled Intervention Verbal Cuing;Compensatory Strategies;Tactile Cuing   Comments v/cs for safety throughout with FWW   How much help from another person does the patient currently need...   Putting on and taking off regular lower body clothing? 3   Bathing (including washing, rinsing, and drying)? 3   Toileting, which includes using a toilet, bedpan, or urinal? 3   Putting on and taking off regular upper body clothing? 4   Taking care of personal grooming such as brushing teeth? 3   Eating meals? 4   6 Clicks Daily Activity Score 20   Functional Mobility   Sit to Stand Supervised   Bed, Chair, Wheelchair Transfer Supervised   Toilet Transfers Supervised   Transfer Method Stand Step   Mobility w/ FWW; improved balance   Skilled Intervention Verbal Cuing;Compensatory Strategies   Activity Tolerance   Comments limited by fatigue    Patient / Family Goals   Patient / Family Goal #1 to go home   Short Term Goals   Short Term Goal # 1 LB dressing with SPV (pants)   Goal Outcome # 1 Goal met   Short Term Goal # 2 toilet txf with SPV    Goal Outcome # 2 Goal met    Short Term Goal # 3 proper use of FWW during ADLs/txfs w/o v/cs   Goal Outcome # 3 Progressing as expected   Short Term Goal # 4 standing g/h with SPV   Goal Outcome # 4 Progressing as expected   Education Group   Education Provided Transfers;Home Safety;Energy Conservation;Activities of Daily Living;Adaptive Equipment;Pathology of bedrest   Energy Conservation Patient Response Patient;Acceptance;Explanation;Reinforcement Needed;No Learning Evidence   Home Safety Patient Response Patient;Acceptance;Explanation;Verbal Demonstration;Reinforcement Needed   Transfers Patient Response Patient;Acceptance;Explanation;Action Demonstration;Reinforcement Needed   ADL Patient Response Patient;Acceptance;Explanation;Reinforcement Needed;Action Demonstration;Demonstration   Adaptive Equipment Patient Response Patient;Acceptance;Explanation;Verbal Demonstration;Reinforcement Needed   Pathology of Bedrest Patient Response Patient;Acceptance;Explanation;Verbal Demonstration;Reinforcement Needed

## 2021-09-08 NOTE — DISCHARGE PLANNING
Agency/Facility Name: [facility]  Spoke To: Josy  Outcome: Pt accepted.     LSW informed

## 2021-09-08 NOTE — PROGRESS NOTES
Patient up to the bathroom to void, small blood clot with hematuria noticed. Will continue to monitor

## 2021-09-08 NOTE — CARE PLAN
Problem: Fall Risk  Goal: Patient will remain free from falls  Outcome: Progressing     Problem: Mobility  Goal: Patient's capacity to carry out activities will improve  Outcome: Progressing   The patient is Stable - Low risk of patient condition declining or worsening    Shift Goals  Clinical Goals: safety  Patient Goals: rest  Family Goals: no family present    Progress made toward(s) clinical / shift goals:  Bed alarm in place, bed locked in lowest position. Pt calls appropriately     Patient is not progressing towards the following goals:

## 2021-09-08 NOTE — DISCHARGE PLANNING
Anticipated Discharge Disposition: Home with HHC and home O2    Action: SUSUW made phone call to [facility] scheduling and spoke with Ladan. Ladan scheduled a PCP appointment with Niharika Wilson in [place] for 9/10 @1300. LSW entered appointment information in pt follow up.    Barriers to Discharge: DME delivery.    Plan: Care coordination will follow up with DME referral.

## 2021-09-09 VITALS
OXYGEN SATURATION: 95 % | WEIGHT: 128.31 LBS | DIASTOLIC BLOOD PRESSURE: 67 MMHG | SYSTOLIC BLOOD PRESSURE: 115 MMHG | BODY MASS INDEX: 16.47 KG/M2 | HEIGHT: 74 IN | TEMPERATURE: 97.3 F | HEART RATE: 63 BPM | RESPIRATION RATE: 17 BRPM

## 2021-09-09 PROBLEM — J96.01 ACUTE HYPOXEMIC RESPIRATORY FAILURE (HCC): Status: ACTIVE | Noted: 2021-09-09

## 2021-09-09 PROCEDURE — 700111 HCHG RX REV CODE 636 W/ 250 OVERRIDE (IP): Performed by: SURGERY

## 2021-09-09 PROCEDURE — 99239 HOSP IP/OBS DSCHRG MGMT >30: CPT | Mod: GC | Performed by: HOSPITALIST

## 2021-09-09 PROCEDURE — A9270 NON-COVERED ITEM OR SERVICE: HCPCS | Performed by: STUDENT IN AN ORGANIZED HEALTH CARE EDUCATION/TRAINING PROGRAM

## 2021-09-09 PROCEDURE — 700102 HCHG RX REV CODE 250 W/ 637 OVERRIDE(OP): Performed by: SURGERY

## 2021-09-09 PROCEDURE — A9270 NON-COVERED ITEM OR SERVICE: HCPCS | Performed by: SURGERY

## 2021-09-09 PROCEDURE — 700102 HCHG RX REV CODE 250 W/ 637 OVERRIDE(OP): Performed by: STUDENT IN AN ORGANIZED HEALTH CARE EDUCATION/TRAINING PROGRAM

## 2021-09-09 RX ORDER — FLUTICASONE PROPIONATE 50 MCG
2 SPRAY, SUSPENSION (ML) NASAL DAILY
Qty: 16 G | Refills: 0 | Status: SHIPPED | OUTPATIENT
Start: 2021-09-10

## 2021-09-09 RX ORDER — ATORVASTATIN CALCIUM 40 MG/1
40 TABLET, FILM COATED ORAL EVERY EVENING
Qty: 30 TABLET | Refills: 3 | Status: SHIPPED | OUTPATIENT
Start: 2021-09-09

## 2021-09-09 RX ORDER — AMLODIPINE BESYLATE 5 MG/1
2.5 TABLET ORAL
Status: DISCONTINUED | OUTPATIENT
Start: 2021-09-09 | End: 2021-09-09

## 2021-09-09 RX ORDER — ASPIRIN 81 MG/1
81 TABLET ORAL DAILY
Qty: 30 TABLET | Refills: 3 | Status: SHIPPED | OUTPATIENT
Start: 2021-09-10

## 2021-09-09 RX ADMIN — POLYETHYLENE GLYCOL 3350 1 PACKET: 17 POWDER, FOR SOLUTION ORAL at 04:57

## 2021-09-09 RX ADMIN — ASPIRIN 81 MG: 81 TABLET, COATED ORAL at 04:56

## 2021-09-09 RX ADMIN — FLUTICASONE PROPIONATE 100 MCG: 50 SPRAY, METERED NASAL at 04:57

## 2021-09-09 RX ADMIN — SENNOSIDES AND DOCUSATE SODIUM 2 TABLET: 50; 8.6 TABLET ORAL at 04:56

## 2021-09-09 RX ADMIN — METOPROLOL TARTRATE 25 MG: 25 TABLET, FILM COATED ORAL at 04:57

## 2021-09-09 RX ADMIN — ENOXAPARIN SODIUM 40 MG: 40 INJECTION SUBCUTANEOUS at 04:56

## 2021-09-09 ASSESSMENT — ENCOUNTER SYMPTOMS
ABDOMINAL PAIN: 0
CONSTIPATION: 0
FEVER: 0
DIZZINESS: 0
LOSS OF CONSCIOUSNESS: 0
NAUSEA: 0
HEADACHES: 0
SHORTNESS OF BREATH: 0
CLAUDICATION: 0
DEPRESSION: 0
DIAPHORESIS: 0
ORTHOPNEA: 0
DIARRHEA: 0
VOMITING: 0
BLOOD IN STOOL: 0
MYALGIAS: 0
DOUBLE VISION: 0
PND: 0
CHILLS: 0
WEAKNESS: 0
HEMOPTYSIS: 0
COUGH: 0
PALPITATIONS: 0
BLURRED VISION: 0

## 2021-09-09 NOTE — PROGRESS NOTES
Discharge patient to home via car with wife and son A&Ox4. Discussed discharge summary with understanding; medications sent to pharmacy of choice.

## 2021-09-09 NOTE — CARE PLAN
The patient is Stable - Low risk of patient condition declining or worsening    Shift Goals  Clinical Goals: Safety, rest  Patient Goals: Sleep  Family Goals: no family present    Progress made toward(s) clinical / shift goals: Discussed with patient plan to monitor incision sites and lower extremity perfusion. Also educated patient on frequently repositioning himself to prevent skin breakdown. Patient indicated understanding, ambulated to the bathroom and back with ease and has been sleeping since.     Problem: Skin Integrity  Goal: Skin integrity is maintained or improved  Outcome: Progressing     Problem: Mobility  Goal: Patient's capacity to carry out activities will improve  Outcome: Progressing

## 2021-09-09 NOTE — ASSESSMENT & PLAN NOTE
Patient continues to need 2 L of O2 per nasal cannula for appropriate oxygen saturations.  An attempt to wean off the patient from oxygen use, he desaturates in the high 70s to mid 80% with ambulation.  -If continued use of O2 is required, order for show home O2 eval and plan on discharging patient on home O2.  -All hypoxic work-up has been negative so far

## 2021-09-09 NOTE — DISCHARGE PLANNING
Anticipated Discharge Disposition: Home with home health and FWW.    Action: Patient medically clear to discharge home today. Patient accepted by [facility] and FWW delivered by [facility]. Patient weaned off oxygen.    Barriers to Discharge: None identified.    Plan: Home with [facility] and FWW via [facility].

## 2021-09-09 NOTE — DISCHARGE INSTRUCTIONS
Discharge Instructions    Discharged to home by car with relative. Discharged via wheelchair, hospital escort: Yes.  Special equipment needed: Walker    Be sure to schedule a follow-up appointment with your primary care doctor or any specialists as instructed.     Discharge Plan:   Diet Plan: Discussed  Activity Level: Discussed  Confirmed Follow up Appointment: Patient to Call and Schedule Appointment  Confirmed Symptoms Management: Discussed  Medication Reconciliation Updated: Yes    I understand that a diet low in cholesterol, fat, and sodium is recommended for good health. Unless I have been given specific instructions below for another diet, I accept this instruction as my diet prescription.   Other diet: Regular    Special Instructions:   Embolectomy and Thrombectomy, Care After  These instructions give you information on caring for yourself after your procedure. Your doctor may also give you more specific instructions. Call your doctor if you have any problems or questions after your procedure.  HOME CARE  · Only take medicine as told by your doctor. Your doctor may give you medicine to thin your blood (blood thinners).  · Tell your doctor if you have bruises, bleeding including nose bleeds, or you throw up (vomit) blood. Tell your doctor if you have blood in your pee (urine) or poop (stools). Tell your doctor if your poop is black and tarry or red.  · Keep all doctor visits as told.  · Change your bandages (dressings) as told.  · Do not swim, bathe, or shower unless your doctor says it is okay.  · Do not use any tobacco products including cigarettes, chewing tobacco or electronic cigarettes.  · Do not drink alcohol.  GET HELP RIGHT AWAY IF:  · You have bleeding from the surgery site.  · You have sudden pain in the foot, leg, hand, or arm.  · You have sudden belly (abdominal) pain.  · Your face droops, you feel weak on one side of your body, or you have trouble speaking.  · You have bloody poop.  · You throw  up blood.  · You suddenly feel short of breath, weak, or dizzy.  · You have chest pain.  · You have drainage, redness, swelling, or pain at the surgery site.  · You have a fever.  MAKE SURE YOU:  · Understand these instructions.  · Will watch your condition.  · Will get help right away if you are not doing well or get worse.     This information is not intended to replace advice given to you by your health care provider. Make sure you discuss any questions you have with your health care provider.     Document Released: 04/12/2012 Document Revised: 01/08/2016 Document Reviewed: 04/12/2012  [employer] Patient Education ©2016 [employer]      · Is patient discharged on Warfarin / Coumadin?   No     Aspirin and Your Heart    Aspirin is a medicine that prevents the cells in the blood that are used for clotting, called platelets, from sticking together. Aspirin can be used to help reduce the risk of blood clots, heart attacks, and other heart-related problems.  Can I take aspirin?  Your health care provider will help you determine whether it is safe and beneficial for you to take aspirin daily. Taking aspirin daily may be helpful if you:  · Have had a heart attack or chest pain.  · Are at risk for a heart attack.  · Have undergone open-heart surgery, such as coronary artery bypass surgery (CABG).  · Have had coronary angioplasty or a stent.  · Have had certain types of stroke or transient ischemic attack (TIA).  · Have peripheral artery disease (PAD).  · Have chronic heart rhythm problems such as atrial fibrillation and cannot take an anticoagulant.  · Have valve disease or have had surgery on a valve.  What are the risks?  Daily use of aspirin can cause side effects. Some of these include:  · Bleeding. Bleeding problems can be minor or serious. An example of a minor problem is a cut that does not stop bleeding. An example of a more serious problem is stomach bleeding or, rarely, bleeding into the brain. Your  risk of bleeding is increased if you are also taking non-steroidal anti-inflammatory drugs (NSAIDs).  · Increased bruising.  · Upset stomach.  · An allergic reaction. People who have nasal polyps have an increased risk of developing an aspirin allergy.  General guidelines  · Take aspirin only as told by your health care provider. Make sure that you understand how much you should take and what form you should take. The two forms of aspirin are:  ? Non-enteric-coated.This type of aspirin does not have a coating and is absorbed quickly. This type of aspirin also comes in a chewable form.  ? Enteric-coated. This type of aspirin has a coating that releases the medicine very slowly. Enteric-coated aspirin might cause less stomach upset than non-enteric-coated aspirin. This type of aspirin should not be chewed or crushed.  · Limit alcohol intake to no more than 1 drink a day for nonpregnant women and 2 drinks a day for men. Drinking alcohol increases your risk of bleeding. One drink equals 12 oz of beer, 5 oz of wine, or 1½ oz of hard liquor.  Contact a health care provider if you:  · Have unusual bleeding or bruising.  · Have stomach pain or nausea.  · Have ringing in your ears.  · Have an allergic reaction that causes:  ? Hives.  ? Itchy skin.  ? Swelling of the lips, tongue, or face.  Get help right away if you:  · Notice that your bowel movements are bloody, dark red, or black in color.  · Vomit or cough up blood.  · Have blood in your urine.  · Cough, have noisy breathing (wheeze), or feel short of breath.  · Have chest pain, especially if the pain spreads to the arms, back, neck, or jaw.  · Have a severe headache, or a headache with confusion, or dizziness.  These symptoms may represent a serious problem that is an emergency. Do not wait to see if the symptoms will go away. Get medical help right away. Call your local emergency services (911 in the [place] Do not drive yourself to the hospital.  Summary  · Aspirin can  be used to help reduce the risk of blood clots, heart attacks, and other heart-related problems.  · Daily use of aspirin can increase your risk of side effects. Your health care provider will help you determine whether it is safe and beneficial for you to take aspirin daily.  · Take aspirin only as told by your health care provider. Make sure that you understand how much you can take and what form you can take.  This information is not intended to replace advice given to you by your health care provider. Make sure you discuss any questions you have with your health care provider.  Document Released: 11/30/2009 Document Revised: 10/18/2018 Document Reviewed: 10/18/2018  [employer] Patient Education © 2020 [employer]    Depression / Suicide Risk    As you are discharged from this [facility] facility, it is important to learn how to keep safe from harming yourself.    Recognize the warning signs:  · Abrupt changes in personality, positive or negative- including increase in energy   · Giving away possessions  · Change in eating patterns- significant weight changes-  positive or negative  · Change in sleeping patterns- unable to sleep or sleeping all the time   · Unwillingness or inability to communicate  · Depression  · Unusual sadness, discouragement and loneliness  · Talk of wanting to die  · Neglect of personal appearance   · Rebelliousness- reckless behavior  · Withdrawal from people/activities they love  · Confusion- inability to concentrate     If you or a loved one observes any of these behaviors or has concerns about self-harm, here's what you can do:  · Talk about it- your feelings and reasons for harming yourself  · Remove any means that you might use to hurt yourself (examples: pills, rope, extension cords, firearm)  · Get professional help from the community (Mental Health, Substance Abuse, psychological counseling)  · Do not be alone:Call your Safe Contact- someone whom you trust who will be there for  you.  · Call your local CRISIS HOTLINE 687-6866 or 565-098-7972  · Call your local Children's Mobile Crisis Response Team [place] (644) 639-0678 or www.[employer]  · Call the toll free National Suicide Prevention Hotlines   · National Suicide Prevention Lifeline 877-102-WBLC (9722)  · National [employer] Line Network 800-SUICIDE (313-0610)

## 2021-09-09 NOTE — PROGRESS NOTES
Daily Progress Note:     Date of Service: 9/9/2021  Primary Team: [facility] Green Team   Attending: Yoselyn Wyatt M.D.   Senior Resident: Dr. Chauhan  Intern: Dr. Perez  Contact:  [phone]    Chief Complaint:   Right leg and foot pain    Subjective:  No acute events overnight  Patient reports he had a BM last night. He is also passing flatus.  Otherwise no acute complaints at this time.  Able to ambulate around the room and to the bathroom, urinating well without louis, encourage increasing his ambulation as tolerated and use of his incentive spirometer.    Consultants/Specialty:  Vascular surgery    Review of Systems:    Review of Systems   Constitutional: Negative for chills, diaphoresis and fever.   HENT: Negative for hearing loss.    Eyes: Negative for blurred vision and double vision.   Respiratory: Negative for cough, hemoptysis and shortness of breath.    Cardiovascular: Negative for chest pain, palpitations, orthopnea, claudication, leg swelling and PND.   Gastrointestinal: Negative for abdominal pain, blood in stool, constipation, diarrhea, melena, nausea and vomiting.   Genitourinary: Negative for dysuria, hematuria and urgency.   Musculoskeletal: Negative for joint pain and myalgias.   Skin: Negative for rash.   Neurological: Negative for dizziness, loss of consciousness, weakness and headaches.   Psychiatric/Behavioral: Negative for depression.       Objective Data:   Physical Exam:   Vitals:   Temp:  [36.3 °C (97.3 °F)-36.7 °C (98 °F)] 36.6 °C (97.9 °F)  Pulse:  [66-73] 73  Resp:  [14-17] 16  BP: (113-135)/(49-71) 120/49  SpO2:  [81 %-97 %] 94 %    Physical Exam  Vitals and nursing note reviewed.   Constitutional:       General: He is not in acute distress.     Appearance: He is not diaphoretic.   HENT:      Head: Normocephalic and atraumatic.      Nose: Nose normal.      Mouth/Throat:      Mouth: Mucous membranes are moist.   Eyes:      Extraocular Movements: Extraocular movements intact.       Conjunctiva/sclera: Conjunctivae normal.      Pupils: Pupils are equal, round, and reactive to light.   Cardiovascular:      Rate and Rhythm: Normal rate and regular rhythm.      Pulses: Normal pulses.      Heart sounds: Normal heart sounds.   Pulmonary:      Effort: Pulmonary effort is normal. No respiratory distress.      Breath sounds: No wheezing, rhonchi or rales.   Abdominal:      General: Abdomen is flat. Bowel sounds are normal. There is no distension.      Palpations: Abdomen is soft.      Tenderness: There is no abdominal tenderness. There is no guarding.      Comments: Mild tenderness to palpation   Musculoskeletal:      Cervical back: Neck supple.      Right lower leg: No edema.      Left lower leg: No edema.   Skin:     General: Skin is warm.      Capillary Refill: Capillary refill takes less than 2 seconds.   Neurological:      General: No focal deficit present.      Mental Status: He is alert and oriented to person, place, and time.   Psychiatric:         Mood and Affect: Mood normal.           Labs:   Lab Results   Component Value Date/Time    WBC 17.9 (H) 09/08/2021 06:38 AM    RBC 3.57 (L) 09/08/2021 06:38 AM    HEMOGLOBIN 11.5 (L) 09/08/2021 06:38 AM    HEMATOCRIT 35.3 (L) 09/08/2021 06:38 AM    MCV 98.9 (H) 09/08/2021 06:38 AM    MCH 32.2 09/08/2021 06:38 AM    MCHC 32.6 (L) 09/08/2021 06:38 AM    MPV 11.1 09/08/2021 06:38 AM    NEUTSPOLYS 80.60 (H) 09/08/2021 06:38 AM    LYMPHOCYTES 8.70 (L) 09/08/2021 06:38 AM    MONOCYTES 9.80 09/08/2021 06:38 AM    EOSINOPHILS 0.00 09/08/2021 06:38 AM    BASOPHILS 0.20 09/08/2021 06:38 AM      Lab Results   Component Value Date/Time    SODIUM 141 09/07/2021 05:11 AM    POTASSIUM 4.0 09/07/2021 05:11 AM    CHLORIDE 105 09/07/2021 05:11 AM    CO2 27 09/07/2021 05:11 AM    GLUCOSE 99 09/07/2021 05:11 AM    BUN 18 09/07/2021 05:11 AM    CREATININE 0.70 09/07/2021 05:11 AM        Imaging:   DX-CHEST-PORTABLE (1 VIEW)   Final Result      No evidence of acute  cardiopulmonary process.      CT-FOREIGN FILM CAT SCAN   Final Result      IR-ABDOMINAL AORTOGRAM    (Results Pending)       Problem Representation:     87 y/o M with no significant documented past medical history who presented with critical RLE limb ischemia secondary to arterial thrombus seeded from incidental AAA of 6.5 cm s/p right femoral thrombectomy and stent graft repair of AAA on 9/5/21 with vascular surgery continuing inpatient hospitalization for for post-operative care and PT/OT evaluation for discharge disposition.      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  An attempt to wean off the patient from oxygen use, he desaturates in the high 70s to mid 80% with ambulation.  -If continued use of O2 is required, order for show home O2 eval and plan on discharging patient on home O2.  -All hypoxic work-up has been negative so far    Constipation  Assessment & Plan  -Has not had BM since prior to admission  -Passing flatus, likely secondary to post-anesthesia since procedure on 9/5/21  -Associated nausea without vomiting  Plan:  -Bisacodyl suppository  -Scheduled bowel regimen  -Will increase regimen as needed, continue to monitor for resolution  -Discontinued opioid pain meds, as patient was not needing them as pain resolved with procedure    Elevated BP without diagnosis of hypertension- (present on admission)  Assessment & Plan  -Patient has not seen a physician in over 10 years, not on antihypertensives at home  -In setting of AAA s/p  stent graft repair would like stricter BP control  Plan:  -Continue Metoprolol as discussed in AAA section, goal -120 mmHg    BMI less than 19,adult- (present on admission)  Assessment & Plan  Patient's BMI is 16.47, patient is sarcopenic.  Albumin 3.9.  Nutritional consult    Hyperglycemia- (present on admission)  Assessment & Plan  -No prior history of DM  -During admission A1c 5.5%  -No pharmacological intervention necessary at this time    Leukemoid reaction- (present on admission)  Assessment & Plan  -WBC 20K on admission, trending down s/p vascular intervention  -Patient does not appear to be septic or infectious, likely was reactive leukocytosis 2/2 acute critical limb ischemia    Abdominal aortic aneurysm (AAA) without rupture (HCC)- (present on admission)  Assessment & Plan  -Patient was a transfer from [facility] after findings on CT scan, concerns for right femoral artery occlusion from embolism of AAA of 6.5 cm  -Patient denied any family history of aneurysms  -s/p RLE thrombectomy and AAA stent graft repair on 9/5/21  Plan:  -Continue strict BP control with goal of 105-120, on Metoprolol tartrate  -Will need vascular outpatient follow up and repeat CT angiography   -PT/OT consulted post-operatively

## 2021-09-09 NOTE — PROGRESS NOTES
Ambulated patient to bathroom on room air.  Patient's placed in bed after and O2 saturation was found to be 80%-83% sustaining for 5 minutes.  Patient placed back on 1 liter via nasal cannula.  Patient's O2 saturation immediately increased to 91%.

## (undated) DEVICE — SUTURE 5-0 PROLENE RB-1 D/A 36 (36PK/BX)"

## (undated) DEVICE — SYRINGE 20 ML LL (50EA/BX 4BX/CA)

## (undated) DEVICE — SUTURE 5-0 PROLENE C-1 D/A 24 (36PK/BX)"

## (undated) DEVICE — GLOVE BIOGEL INDICATOR SZ 7.5 SURGICAL PF LTX - (50PR/BX 4BX/CA)

## (undated) DEVICE — CATHETER EMBOLECTOMY 2FR (5EA/CA)

## (undated) DEVICE — WIRE GUIDE LUNDERQST .035X260  (5EA/BX)

## (undated) DEVICE — STOPCOCK MALE 4-WAY - (50/CA)

## (undated) DEVICE — BLADE SURGICAL #15 - (50/BX 3BX/CA)

## (undated) DEVICE — SYRINGE 30 ML LL (56/BX)

## (undated) DEVICE — SLEEVE, VASO, THIGH, MED

## (undated) DEVICE — SHEATH INTRODUCER PERFORMER 12F X 30CM

## (undated) DEVICE — GOWN WARMING STANDARD FLEX - (30/CA)

## (undated) DEVICE — GLOVE BIOGEL SZ 7 SURGICAL PF LTX - (50PR/BX 4BX/CA)

## (undated) DEVICE — SYRINGE DISP. 12 CC LL - (100/BX)

## (undated) DEVICE — SENSOR SPO2 NEO LNCS ADHESIVE (20/BX) SEE USER NOTES

## (undated) DEVICE — GUIDEWIRES STARTER (PTFE COATED) J CURVED FIXED CORE 0.035 180CM 10 3 MM J FS

## (undated) DEVICE — SPONGE GAUZESTER. 2X2 4-PL - (2/PK 50PK/BX 30BX/CS)

## (undated) DEVICE — SUTURE 2-0 SILK 12 X 18" (36PK/BX)"

## (undated) DEVICE — NEPTUNE 4 PORT MANIFOLD - (20/PK)

## (undated) DEVICE — CATHETER ANGIO BER II 4F 65CM (5/BX)

## (undated) DEVICE — ELECTRODE 850 FOAM ADHESIVE - HYDROGEL RADIOTRNSPRNT (50/PK)

## (undated) DEVICE — CATHETER EMBOLECTOMY 3FR (1EA)

## (undated) DEVICE — NEEDLE THINWALL 19GA X 7CM

## (undated) DEVICE — LACTATED RINGERS INJ 1000 ML - (14EA/CA 60CA/PF)

## (undated) DEVICE — CLIP MED INTNL HRZN TI ESCP - (25/BX)

## (undated) DEVICE — PACK MINOR BASIN - (2EA/CA)

## (undated) DEVICE — GLIDEWIRE ANGLED .035 X 180 (5EA/BX)

## (undated) DEVICE — HEMOSTAT SURG ABSORBABLE - 2 X 3 IN SURGICEL (24EA/CA)

## (undated) DEVICE — GLOVE BIOGEL SZ 7.5 SURGICAL PF LTX - (50PR/BX 4BX/CA)

## (undated) DEVICE — COVER LIGHT HANDLE FLEXIBLE - SOFT (2EA/PK 80PK/CA)

## (undated) DEVICE — PACK MAJOR BASIN - (2EA/CA)

## (undated) DEVICE — SUTURE 6-0 PROLENE BV-1 D/A 24 (36PK/BX)"

## (undated) DEVICE — GLOVE COTTON STERILE (50/CA)

## (undated) DEVICE — DRAPE SURGICAL U 77X120 - (10/CA)

## (undated) DEVICE — DRAPE IOBAN II INCISE 23X17 - (10EA/BX 4BX/CA)

## (undated) DEVICE — DRAPE IOBAN II 23 IN X 33 IN - (10/BX)

## (undated) DEVICE — MARKER SIZING OMNIFLUSH 5F 70 - 5/BX .035 20CM SEGMENT

## (undated) DEVICE — TRAY SURESTEP FOLEY TEMP SENSING 16FR (10EA/CA) ORDER  #18764 FOR TEMP FOLEY ONLY

## (undated) DEVICE — ELECTRODE DUAL RETURN W/ CORD - (50/PK)

## (undated) DEVICE — BLADE SURGICAL #11 - (50/BX)

## (undated) DEVICE — GLOVE SZ 7 BIOGEL PI MICRO - PF LF (50PR/BX 4BX/CA)

## (undated) DEVICE — CHLORAPREP 26 ML APPLICATOR - ORANGE TINT(25/CA)

## (undated) DEVICE — SPONGE XRAY 8X4 STERL. 12PL - (10EA/TY 80TY/CA)

## (undated) DEVICE — GOWN SURGICAL X-LARGE ULTRA - FILM-REINFORCED (20/CA)

## (undated) DEVICE — TUBING CLEARLINK DUO-VENT - C-FLO (48EA/CA)

## (undated) DEVICE — SODIUM CHL. INJ. 0.9% 500ML (24EA/CA 50CA/PF)

## (undated) DEVICE — DRAPE LARGE 3 QUARTER - (20/CA)

## (undated) DEVICE — VESSELOOP MINI BLUE STERILE - SURG-I-LOOP (10EA/BX)

## (undated) DEVICE — SUTURE 3-0 VICRYL PLUS SH - 27 INCH (36/BX)

## (undated) DEVICE — VESSELOOP MAXI BLUE STERILE- SURG-I-LOOP (10EA/BX)

## (undated) DEVICE — ADAPTOR DBL MALE LUER LOCK - 25/CS

## (undated) DEVICE — SHEATH RO 6F 10CM (10EA/BX)

## (undated) DEVICE — MULTI TORQUE DEVICE DISP (5EA/BX)

## (undated) DEVICE — DISC, CD-R PLAT 700MB MEMOREX

## (undated) DEVICE — SUTURE 3-0 SILK 12 X 18 IN - (36/BX)

## (undated) DEVICE — GOWN SURGEONS X-LARGE - DISP. (30/CA)

## (undated) DEVICE — GLOVE SIZE 7.0 SURGEON ACCELERATOR FREE GREEN (50PR/BX 4BX/CA)

## (undated) DEVICE — STAPLER SKIN DISP - (6/BX 10BX/CA) VISISTAT

## (undated) DEVICE — POLY UMBILICAL TAPE 1/8X30 - (36/BX)

## (undated) DEVICE — CLIP SM INTNL HRZN TI ESCP LGT - (24EA/PK 25PK/BX)

## (undated) DEVICE — SET EXTENSION WITH 2 PORTS (48EA/CA) ***PART #2C8610 IS A SUBSTITUTE*****

## (undated) DEVICE — TRANSDUCER ADULT DISP. SINGLE BONDED STERILE - (20EA/CA)

## (undated) DEVICE — KIT ANESTHESIA W/CIRCUIT & 3/LT BAG W/FILTER (20EA/CA)

## (undated) DEVICE — SET LEADWIRE 5 LEAD BEDSIDE DISPOSABLE ECG (1SET OF 5/EA)

## (undated) DEVICE — CANISTER SUCTION 3000ML MECHANICAL FILTER AUTO SHUTOFF MEDI-VAC NONSTERILE LF DISP  (40EA/CA)

## (undated) DEVICE — SET INTRO MIRCROPUNCTURE - MPIS-501-SST

## (undated) DEVICE — PROTECTOR ULNA NERVE - (36PR/CA)

## (undated) DEVICE — TOWELS CLOTH SURGICAL - (4/PK 20PK/CA)

## (undated) DEVICE — SYRINGE NON SAFETY 10 CC 20 GA X 1-1/2 IN (100/BX 4BX/CA)

## (undated) DEVICE — HEAD HOLDER JUNIOR/ADULT

## (undated) DEVICE — BALLOON RELIANT

## (undated) DEVICE — TOWEL STOP TIMEOUT SAFETY FLAG (40EA/CA)

## (undated) DEVICE — INSTRUMENT JAWCOVER SUTURE - BOOTS (5PK/BX)

## (undated) DEVICE — SUTURE 5-0 PROLENE BV-1 HEMOSEAL (36PK/BX)

## (undated) DEVICE — DRESSING TRANSPARENT FILM TEGADERM 2.375 X 2.75"  (100EA/BX)"

## (undated) DEVICE — SODIUM CHL IRRIGATION 0.9% 1000ML (12EA/CA)

## (undated) DEVICE — SET EXTENSION 31IN APPX 3.2ML WITH CLAMP (50/CA)WAS 4610-03

## (undated) DEVICE — SUCTION INSTRUMENT YANKAUER BULBOUS TIP W/O VENT (50EA/CA)

## (undated) DEVICE — DEVICE INFLATION DIGITAL BLUE DIAMOND (5EA/BX)

## (undated) DEVICE — PAD PREP 24 X 48 CUFFED - (100/CA)

## (undated) DEVICE — SUTURE CV

## (undated) DEVICE — SHEAR HS FOCUS 9CM CVD - (6/BX)

## (undated) DEVICE — MASK ANESTHESIA ADULT  - (100/CA)

## (undated) DEVICE — WIRE GUIDE LUNDQST.035X180 - TSMG-35-180-4-LES ORDER BY BOX (5EA/BX)

## (undated) DEVICE — DECANTER FLD BLS - (50/CA)

## (undated) DEVICE — SUTURE GENERAL

## (undated) DEVICE — SUTURE 4-0 MONOCRYL PLUS PS-2 - 27 INCH (36/BX)